# Patient Record
Sex: FEMALE | Race: BLACK OR AFRICAN AMERICAN | NOT HISPANIC OR LATINO
[De-identification: names, ages, dates, MRNs, and addresses within clinical notes are randomized per-mention and may not be internally consistent; named-entity substitution may affect disease eponyms.]

---

## 2018-05-08 ENCOUNTER — APPOINTMENT (OUTPATIENT)
Dept: NEUROLOGY | Facility: CLINIC | Age: 33
End: 2018-05-08
Payer: COMMERCIAL

## 2018-05-08 DIAGNOSIS — Z82.49 FAMILY HISTORY OF ISCHEMIC HEART DISEASE AND OTHER DISEASES OF THE CIRCULATORY SYSTEM: ICD-10-CM

## 2018-05-08 DIAGNOSIS — Z78.9 OTHER SPECIFIED HEALTH STATUS: ICD-10-CM

## 2018-05-08 DIAGNOSIS — Z80.9 FAMILY HISTORY OF MALIGNANT NEOPLASM, UNSPECIFIED: ICD-10-CM

## 2018-05-08 DIAGNOSIS — G43.709 CHRONIC MIGRAINE W/OUT AURA, NOT INTRACTABLE, W/OUT STATUS MIGRAINOSUS: ICD-10-CM

## 2018-05-08 PROCEDURE — 99204 OFFICE O/P NEW MOD 45 MIN: CPT

## 2018-08-01 ENCOUNTER — APPOINTMENT (OUTPATIENT)
Dept: NEUROLOGY | Facility: CLINIC | Age: 33
End: 2018-08-01

## 2018-11-20 ENCOUNTER — APPOINTMENT (OUTPATIENT)
Dept: NEUROLOGY | Facility: CLINIC | Age: 33
End: 2018-11-20
Payer: COMMERCIAL

## 2018-11-20 VITALS
DIASTOLIC BLOOD PRESSURE: 60 MMHG | HEIGHT: 63 IN | BODY MASS INDEX: 32.6 KG/M2 | HEART RATE: 88 BPM | WEIGHT: 184 LBS | SYSTOLIC BLOOD PRESSURE: 110 MMHG

## 2018-11-20 PROCEDURE — 99214 OFFICE O/P EST MOD 30 MIN: CPT

## 2019-05-07 ENCOUNTER — APPOINTMENT (OUTPATIENT)
Dept: NEUROLOGY | Facility: CLINIC | Age: 34
End: 2019-05-07

## 2019-09-09 ENCOUNTER — APPOINTMENT (OUTPATIENT)
Dept: OBGYN | Facility: CLINIC | Age: 34
End: 2019-09-09
Payer: COMMERCIAL

## 2019-09-09 VITALS
SYSTOLIC BLOOD PRESSURE: 130 MMHG | HEIGHT: 63 IN | BODY MASS INDEX: 33.31 KG/M2 | DIASTOLIC BLOOD PRESSURE: 84 MMHG | WEIGHT: 188 LBS | HEART RATE: 100 BPM

## 2019-09-09 DIAGNOSIS — Z01.419 ENCOUNTER FOR GYNECOLOGICAL EXAMINATION (GENERAL) (ROUTINE) W/OUT ABNORMAL FINDINGS: ICD-10-CM

## 2019-09-09 PROCEDURE — 99385 PREV VISIT NEW AGE 18-39: CPT

## 2019-09-09 RX ORDER — TRETINOIN 0.5 MG/G
0.05 CREAM TOPICAL
Refills: 0 | Status: DISCONTINUED | COMMUNITY
End: 2019-09-09

## 2019-09-09 NOTE — CHIEF COMPLAINT
[Initial Visit] : initial GYN visit [FreeTextEntry1] : 34 y.o. P 0110  presents for follow up to recent 5 mos . old demise. male child was born at 26 weeks, 2019, , at [facility], and remained in the NICU there for 2 mos . and then was transferrred to [facility] , where the  child ultimately  on 2019. secondary to chronic lung disease. . PMH- negative, PSHx- negative, FH- HTN- father, Diabetes - MGM, mat. aunt. leukemia- MGM, ( ). Breast ca - pat. aunts x 2 (  . ). SH- social ETOH, GYN hx - ETOP x 1. OB Hx - primary C/S at 26 weeks for cat 2 tracing . ROS- clinical researcher- Cora LMP 19,

## 2019-09-10 ENCOUNTER — TRANSCRIPTION ENCOUNTER (OUTPATIENT)
Age: 34
End: 2019-09-10

## 2019-09-10 LAB
ALBUMIN SERPL ELPH-MCNC: 4.5 G/DL
ALP BLD-CCNC: 86 U/L
ALT SERPL-CCNC: 8 U/L
ANION GAP SERPL CALC-SCNC: 12 MMOL/L
APTT BLD: 32.2 SEC
AST SERPL-CCNC: 15 U/L
AT III PPP CHRO-ACNC: 96 %
BASOPHILS # BLD AUTO: 0.02 K/UL
BASOPHILS NFR BLD AUTO: 0.4 %
BILIRUB SERPL-MCNC: 0.2 MG/DL
BUN SERPL-MCNC: 16 MG/DL
C TRACH RRNA SPEC QL NAA+PROBE: NOT DETECTED
CALCIUM SERPL-MCNC: 9.3 MG/DL
CHLORIDE SERPL-SCNC: 105 MMOL/L
CMV IGG SERPL QL: 0.53 U/ML
CMV IGG SERPL-IMP: NEGATIVE
CMV IGM SERPL QL: <8 AU/ML
CMV IGM SERPL QL: NEGATIVE
CO2 SERPL-SCNC: 23 MMOL/L
CONFIRM: 26.1 SEC
CREAT SERPL-MCNC: 0.7 MG/DL
DRVVT IMM 1:2 NP PPP: NORMAL
DRVVT SCREEN TO CONFIRM RATIO: 0.85 RATIO
EOSINOPHIL # BLD AUTO: 0.06 K/UL
EOSINOPHIL NFR BLD AUTO: 1.1 %
ESTIMATED AVERAGE GLUCOSE: 91 MG/DL
GLUCOSE SERPL-MCNC: 85 MG/DL
HBA1C MFR BLD HPLC: 4.8 %
HCT VFR BLD CALC: 35.1 %
HCYS SERPL-MCNC: 6.1 UMOL/L
HGB BLD-MCNC: 11 G/DL
HSV 1+2 IGG SER IA-IMP: NEGATIVE
HSV 1+2 IGG SER IA-IMP: NEGATIVE
HSV1 IGG SER QL: 0.34 INDEX
HSV2 IGG SER QL: 0.16 INDEX
IMM GRANULOCYTES NFR BLD AUTO: 0.2 %
LYMPHOCYTES # BLD AUTO: 2.2 K/UL
LYMPHOCYTES NFR BLD AUTO: 40.4 %
MAN DIFF?: NORMAL
MCHC RBC-ENTMCNC: 30.6 PG
MCHC RBC-ENTMCNC: 31.3 GM/DL
MCV RBC AUTO: 97.5 FL
MEV IGG FLD QL IA: 29.2 AU/ML
MEV IGG+IGM SER-IMP: POSITIVE
MONOCYTES # BLD AUTO: 0.25 K/UL
MONOCYTES NFR BLD AUTO: 4.6 %
N GONORRHOEA RRNA SPEC QL NAA+PROBE: NOT DETECTED
NEUTROPHILS # BLD AUTO: 2.91 K/UL
NEUTROPHILS NFR BLD AUTO: 53.3 %
PLATELET # BLD AUTO: 293 K/UL
POTASSIUM SERPL-SCNC: 4.5 MMOL/L
PROT S AG ACT/NOR PPP IA: 92 %
PROT SERPL-MCNC: 6.9 G/DL
RBC # BLD: 3.6 M/UL
RBC # FLD: 13.5 %
RUBV IGG FLD-ACNC: 3.7 INDEX
RUBV IGG SER-IMP: POSITIVE
SCREEN DRVVT: 27.7 SEC
SILICA CLOTTING TIME INTERPRETATION: NORMAL
SILICA CLOTTING TIME S/C: 0.9 RATIO
SODIUM SERPL-SCNC: 140 MMOL/L
SOURCE AMPLIFICATION: NORMAL
T GONDII AB SER-IMP: NEGATIVE
T GONDII AB SER-IMP: NEGATIVE
T GONDII IGG SER QL: <3 IU/ML
T GONDII IGM SER QL: <3 AU/ML
WBC # FLD AUTO: 5.45 K/UL

## 2019-09-11 ENCOUNTER — FORM ENCOUNTER (OUTPATIENT)
Age: 34
End: 2019-09-11

## 2019-09-11 LAB
HPV HIGH+LOW RISK DNA PNL CVX: NOT DETECTED
HSV1 IGM SER QL: NORMAL TITER
HSV2 AB FLD-ACNC: NORMAL TITER
PTR INTERP: NORMAL
RUBV IGM FLD-ACNC: <20 AU/ML

## 2019-09-12 ENCOUNTER — APPOINTMENT (OUTPATIENT)
Dept: ULTRASOUND IMAGING | Facility: CLINIC | Age: 34
End: 2019-09-12
Payer: COMMERCIAL

## 2019-09-12 ENCOUNTER — OUTPATIENT (OUTPATIENT)
Dept: OUTPATIENT SERVICES | Facility: HOSPITAL | Age: 34
LOS: 1 days | End: 2019-09-12
Payer: COMMERCIAL

## 2019-09-12 DIAGNOSIS — Z01.419 ENCOUNTER FOR GYNECOLOGICAL EXAMINATION (GENERAL) (ROUTINE) WITHOUT ABNORMAL FINDINGS: ICD-10-CM

## 2019-09-12 LAB
B2 GLYCOPROT1 AB SER QL: NEGATIVE
CARDIOLIPIN AB SER IA-ACNC: NEGATIVE
DNA PLOIDY SPEC FC-IMP: NORMAL
MEV IGM SER QL: NEGATIVE

## 2019-09-12 PROCEDURE — 76830 TRANSVAGINAL US NON-OB: CPT | Mod: 26

## 2019-09-12 PROCEDURE — 76830 TRANSVAGINAL US NON-OB: CPT

## 2019-09-12 PROCEDURE — 76856 US EXAM PELVIC COMPLETE: CPT | Mod: 26

## 2019-09-12 PROCEDURE — 76856 US EXAM PELVIC COMPLETE: CPT

## 2019-09-13 LAB — CYTOLOGY CVX/VAG DOC THIN PREP: NORMAL

## 2019-09-23 ENCOUNTER — CHART COPY (OUTPATIENT)
Age: 34
End: 2019-09-23

## 2019-09-24 ENCOUNTER — OTHER (OUTPATIENT)
Age: 34
End: 2019-09-24

## 2019-12-30 ENCOUNTER — TRANSCRIPTION ENCOUNTER (OUTPATIENT)
Age: 34
End: 2019-12-30

## 2020-01-14 ENCOUNTER — APPOINTMENT (OUTPATIENT)
Dept: OBGYN | Facility: CLINIC | Age: 35
End: 2020-01-14
Payer: COMMERCIAL

## 2020-01-14 ENCOUNTER — ASOB RESULT (OUTPATIENT)
Age: 35
End: 2020-01-14

## 2020-01-14 VITALS
SYSTOLIC BLOOD PRESSURE: 116 MMHG | HEART RATE: 77 BPM | HEIGHT: 63 IN | BODY MASS INDEX: 32.99 KG/M2 | DIASTOLIC BLOOD PRESSURE: 77 MMHG | WEIGHT: 186.19 LBS

## 2020-01-14 PROCEDURE — 76817 TRANSVAGINAL US OBSTETRIC: CPT

## 2020-01-14 PROCEDURE — 99213 OFFICE O/P EST LOW 20 MIN: CPT

## 2020-01-14 RX ORDER — MELOXICAM 15 MG/1
15 TABLET ORAL
Qty: 30 | Refills: 0 | Status: COMPLETED | COMMUNITY
Start: 2019-08-30 | End: 2020-01-14

## 2020-01-14 NOTE — CHIEF COMPLAINT
[Follow Up] : follow up GYN visit [FreeTextEntry1] : 34 y.o. P 0110  LMP 11/22/19, presents for confirmation of pregnancy. pt feels nauseous, but well, pt has hx of 26 delivery, via C/S secondary to a category 2 tracing at [facility]. history surrounding the delivery is not completely clear, but pt returned from trip to [place] and [place], and was evaluated  at routine visit post trip where fetus was found to be growth restricted. baby succumbed at 5 mos of life secondary to chronic lung disease. pt was seen by me Sept. 2019, ( see notes) and w/u was essentially non-revealing, pt has known uterine myomata pt has hx of prolactinoma ( not documented in Sept history). followed by neuroopththalmologist  and Endocrinologist at [place]  . EGA by LMP is 7 weeks 4 days.

## 2020-01-15 LAB
ABO + RH PNL BLD: NORMAL
BASOPHILS # BLD AUTO: 0.02 K/UL
BASOPHILS NFR BLD AUTO: 0.3 %
BLD GP AB SCN SERPL QL: NORMAL
C TRACH RRNA SPEC QL NAA+PROBE: NOT DETECTED
EOSINOPHIL # BLD AUTO: 0.04 K/UL
EOSINOPHIL NFR BLD AUTO: 0.6 %
HBV SURFACE AG SER QL: NONREACTIVE
HCT VFR BLD CALC: 35.8 %
HCV AB SER QL: NONREACTIVE
HCV S/CO RATIO: 0.1 S/CO
HGB BLD-MCNC: 11.7 G/DL
HIV1+2 AB SPEC QL IA.RAPID: NONREACTIVE
IMM GRANULOCYTES NFR BLD AUTO: 0.2 %
LYMPHOCYTES # BLD AUTO: 1.98 K/UL
LYMPHOCYTES NFR BLD AUTO: 31.8 %
MAN DIFF?: NORMAL
MCHC RBC-ENTMCNC: 32.5 PG
MCHC RBC-ENTMCNC: 32.7 GM/DL
MCV RBC AUTO: 99.4 FL
MEV IGG FLD QL IA: 31.6 AU/ML
MEV IGG+IGM SER-IMP: POSITIVE
MONOCYTES # BLD AUTO: 0.45 K/UL
MONOCYTES NFR BLD AUTO: 7.2 %
N GONORRHOEA RRNA SPEC QL NAA+PROBE: NOT DETECTED
NEUTROPHILS # BLD AUTO: 3.73 K/UL
NEUTROPHILS NFR BLD AUTO: 59.9 %
PLATELET # BLD AUTO: 292 K/UL
PROLACTIN SERPL-MCNC: 70.9 NG/ML
RBC # BLD: 3.6 M/UL
RBC # FLD: 12.7 %
RUBV IGG FLD-ACNC: 3.4 INDEX
RUBV IGG SER-IMP: POSITIVE
SOURCE AMPLIFICATION: NORMAL
T PALLIDUM AB SER QL IA: NEGATIVE
TSH SERPL-ACNC: 0.68 UIU/ML
VZV AB TITR SER: POSITIVE
VZV IGG SER IF-ACNC: 511.8 INDEX
WBC # FLD AUTO: 6.23 K/UL

## 2020-01-16 LAB
BACTERIA UR CULT: NORMAL
ENA SS-A AB SER IA-ACNC: <0.2 AL
ENA SS-B AB SER IA-ACNC: <0.2 AL
HGB A MFR BLD: 97.2 %
HGB A2 MFR BLD: 2.8 %
HGB FRACT BLD-IMP: NORMAL
MEV IGM SER QL: NEGATIVE

## 2020-01-21 ENCOUNTER — OTHER (OUTPATIENT)
Age: 35
End: 2020-01-21

## 2020-01-21 LAB
AR GENE MUT ANL BLD/T: NEGATIVE
B19V IGG SER QL IA: 6.4 INDEX
B19V IGG+IGM SER-IMP: NORMAL
B19V IGG+IGM SER-IMP: POSITIVE
B19V IGM FLD-ACNC: 0.3 INDEX
B19V IGM SER-ACNC: NEGATIVE
CFTR MUT TESTED BLD/T: NEGATIVE

## 2020-01-22 LAB — FMR1 GENE MUT ANL BLD/T: NORMAL

## 2020-02-19 ENCOUNTER — APPOINTMENT (OUTPATIENT)
Dept: ANTEPARTUM | Facility: CLINIC | Age: 35
End: 2020-02-19
Payer: COMMERCIAL

## 2020-02-19 ENCOUNTER — LABORATORY RESULT (OUTPATIENT)
Age: 35
End: 2020-02-19

## 2020-02-19 ENCOUNTER — ASOB RESULT (OUTPATIENT)
Age: 35
End: 2020-02-19

## 2020-02-19 PROCEDURE — 36416 COLLJ CAPILLARY BLOOD SPEC: CPT

## 2020-02-19 PROCEDURE — 76813 OB US NUCHAL MEAS 1 GEST: CPT

## 2020-02-19 PROCEDURE — 76801 OB US < 14 WKS SINGLE FETUS: CPT

## 2020-02-21 ENCOUNTER — APPOINTMENT (OUTPATIENT)
Dept: OBGYN | Facility: CLINIC | Age: 35
End: 2020-02-21
Payer: COMMERCIAL

## 2020-02-21 VITALS
WEIGHT: 189 LBS | SYSTOLIC BLOOD PRESSURE: 112 MMHG | HEIGHT: 63 IN | BODY MASS INDEX: 33.49 KG/M2 | DIASTOLIC BLOOD PRESSURE: 77 MMHG

## 2020-02-21 DIAGNOSIS — O09.219 SUPERVISION OF PREGNANCY WITH HISTORY OF PRE-TERM LABOR, UNSPECIFIED TRIMESTER: ICD-10-CM

## 2020-02-21 PROCEDURE — 0501F PRENATAL FLOW SHEET: CPT

## 2020-03-27 ENCOUNTER — APPOINTMENT (OUTPATIENT)
Dept: OBGYN | Facility: CLINIC | Age: 35
End: 2020-03-27
Payer: COMMERCIAL

## 2020-03-27 ENCOUNTER — LABORATORY RESULT (OUTPATIENT)
Age: 35
End: 2020-03-27

## 2020-03-27 VITALS
BODY MASS INDEX: 33.31 KG/M2 | WEIGHT: 188 LBS | SYSTOLIC BLOOD PRESSURE: 112 MMHG | HEIGHT: 63 IN | HEIGHT: 63 IN | DIASTOLIC BLOOD PRESSURE: 75 MMHG

## 2020-03-27 PROCEDURE — 0502F SUBSEQUENT PRENATAL CARE: CPT

## 2020-04-23 ENCOUNTER — ASOB RESULT (OUTPATIENT)
Age: 35
End: 2020-04-23

## 2020-04-23 ENCOUNTER — APPOINTMENT (OUTPATIENT)
Dept: ANTEPARTUM | Facility: CLINIC | Age: 35
End: 2020-04-23
Payer: COMMERCIAL

## 2020-04-23 PROCEDURE — 76811 OB US DETAILED SNGL FETUS: CPT

## 2020-04-24 ENCOUNTER — APPOINTMENT (OUTPATIENT)
Dept: OBGYN | Facility: CLINIC | Age: 35
End: 2020-04-24
Payer: COMMERCIAL

## 2020-04-24 VITALS
BODY MASS INDEX: 34.2 KG/M2 | HEIGHT: 63 IN | SYSTOLIC BLOOD PRESSURE: 116 MMHG | WEIGHT: 193 LBS | DIASTOLIC BLOOD PRESSURE: 74 MMHG

## 2020-04-24 PROCEDURE — 0502F SUBSEQUENT PRENATAL CARE: CPT

## 2020-04-28 ENCOUNTER — TRANSCRIPTION ENCOUNTER (OUTPATIENT)
Age: 35
End: 2020-04-28

## 2020-05-22 ENCOUNTER — APPOINTMENT (OUTPATIENT)
Dept: ANTEPARTUM | Facility: CLINIC | Age: 35
End: 2020-05-22
Payer: COMMERCIAL

## 2020-05-22 ENCOUNTER — ASOB RESULT (OUTPATIENT)
Age: 35
End: 2020-05-22

## 2020-05-22 PROCEDURE — 76816 OB US FOLLOW-UP PER FETUS: CPT

## 2020-05-26 ENCOUNTER — APPOINTMENT (OUTPATIENT)
Dept: OBGYN | Facility: CLINIC | Age: 35
End: 2020-05-26
Payer: COMMERCIAL

## 2020-05-26 VITALS
HEIGHT: 63 IN | SYSTOLIC BLOOD PRESSURE: 101 MMHG | DIASTOLIC BLOOD PRESSURE: 69 MMHG | WEIGHT: 193 LBS | BODY MASS INDEX: 34.2 KG/M2

## 2020-05-26 PROCEDURE — 0502F SUBSEQUENT PRENATAL CARE: CPT

## 2020-05-27 LAB
BASOPHILS # BLD AUTO: 0.02 K/UL
BASOPHILS NFR BLD AUTO: 0.3 %
EOSINOPHIL # BLD AUTO: 0.04 K/UL
EOSINOPHIL NFR BLD AUTO: 0.5 %
GLUCOSE 1H P 50 G GLC PO SERPL-MCNC: 61 MG/DL
HCT VFR BLD CALC: 33.3 %
HGB BLD-MCNC: 10.9 G/DL
IMM GRANULOCYTES NFR BLD AUTO: 0.4 %
LYMPHOCYTES # BLD AUTO: 2.02 K/UL
LYMPHOCYTES NFR BLD AUTO: 25.5 %
MAN DIFF?: NORMAL
MCHC RBC-ENTMCNC: 32.7 GM/DL
MCHC RBC-ENTMCNC: 33.7 PG
MCV RBC AUTO: 103.1 FL
MONOCYTES # BLD AUTO: 0.58 K/UL
MONOCYTES NFR BLD AUTO: 7.3 %
NEUTROPHILS # BLD AUTO: 5.22 K/UL
NEUTROPHILS NFR BLD AUTO: 66 %
PLATELET # BLD AUTO: 244 K/UL
RBC # BLD: 3.23 M/UL
RBC # FLD: 13 %
WBC # FLD AUTO: 7.91 K/UL

## 2020-06-09 ENCOUNTER — APPOINTMENT (OUTPATIENT)
Dept: OBGYN | Facility: CLINIC | Age: 35
End: 2020-06-09
Payer: COMMERCIAL

## 2020-06-09 VITALS
DIASTOLIC BLOOD PRESSURE: 74 MMHG | WEIGHT: 195 LBS | SYSTOLIC BLOOD PRESSURE: 115 MMHG | BODY MASS INDEX: 34.55 KG/M2 | HEIGHT: 63 IN

## 2020-06-09 PROCEDURE — 0502F SUBSEQUENT PRENATAL CARE: CPT

## 2020-06-15 ENCOUNTER — RX RENEWAL (OUTPATIENT)
Age: 35
End: 2020-06-15

## 2020-06-18 ENCOUNTER — ASOB RESULT (OUTPATIENT)
Age: 35
End: 2020-06-18

## 2020-06-18 ENCOUNTER — APPOINTMENT (OUTPATIENT)
Dept: ANTEPARTUM | Facility: CLINIC | Age: 35
End: 2020-06-18
Payer: COMMERCIAL

## 2020-06-18 PROCEDURE — 76816 OB US FOLLOW-UP PER FETUS: CPT

## 2020-06-18 PROCEDURE — 76819 FETAL BIOPHYS PROFIL W/O NST: CPT

## 2020-06-23 ENCOUNTER — APPOINTMENT (OUTPATIENT)
Dept: OBGYN | Facility: CLINIC | Age: 35
End: 2020-06-23
Payer: COMMERCIAL

## 2020-06-23 VITALS
SYSTOLIC BLOOD PRESSURE: 104 MMHG | BODY MASS INDEX: 34.73 KG/M2 | WEIGHT: 196 LBS | DIASTOLIC BLOOD PRESSURE: 71 MMHG | HEIGHT: 63 IN

## 2020-06-23 PROCEDURE — 0502F SUBSEQUENT PRENATAL CARE: CPT

## 2020-07-07 ENCOUNTER — APPOINTMENT (OUTPATIENT)
Dept: OBGYN | Facility: CLINIC | Age: 35
End: 2020-07-07
Payer: COMMERCIAL

## 2020-07-07 VITALS
HEIGHT: 63 IN | BODY MASS INDEX: 34.91 KG/M2 | SYSTOLIC BLOOD PRESSURE: 113 MMHG | DIASTOLIC BLOOD PRESSURE: 70 MMHG | WEIGHT: 197 LBS

## 2020-07-07 PROCEDURE — 0502F SUBSEQUENT PRENATAL CARE: CPT

## 2020-07-21 ENCOUNTER — APPOINTMENT (OUTPATIENT)
Dept: OBGYN | Facility: CLINIC | Age: 35
End: 2020-07-21
Payer: COMMERCIAL

## 2020-07-21 VITALS
HEIGHT: 63 IN | DIASTOLIC BLOOD PRESSURE: 65 MMHG | BODY MASS INDEX: 35.26 KG/M2 | WEIGHT: 199 LBS | SYSTOLIC BLOOD PRESSURE: 109 MMHG

## 2020-07-21 PROCEDURE — 0502F SUBSEQUENT PRENATAL CARE: CPT

## 2020-07-22 ENCOUNTER — ASOB RESULT (OUTPATIENT)
Age: 35
End: 2020-07-22

## 2020-07-22 ENCOUNTER — APPOINTMENT (OUTPATIENT)
Dept: ANTEPARTUM | Facility: CLINIC | Age: 35
End: 2020-07-22
Payer: COMMERCIAL

## 2020-07-22 PROCEDURE — 76816 OB US FOLLOW-UP PER FETUS: CPT

## 2020-07-22 PROCEDURE — 76819 FETAL BIOPHYS PROFIL W/O NST: CPT

## 2020-07-29 ENCOUNTER — OUTPATIENT (OUTPATIENT)
Dept: OUTPATIENT SERVICES | Facility: HOSPITAL | Age: 35
LOS: 1 days | End: 2020-07-29

## 2020-07-29 VITALS
RESPIRATION RATE: 16 BRPM | HEART RATE: 89 BPM | TEMPERATURE: 98 F | OXYGEN SATURATION: 99 % | WEIGHT: 192.02 LBS | DIASTOLIC BLOOD PRESSURE: 78 MMHG | SYSTOLIC BLOOD PRESSURE: 110 MMHG | HEIGHT: 63 IN

## 2020-07-29 DIAGNOSIS — Z98.890 OTHER SPECIFIED POSTPROCEDURAL STATES: ICD-10-CM

## 2020-07-29 DIAGNOSIS — Z98.891 HISTORY OF UTERINE SCAR FROM PREVIOUS SURGERY: ICD-10-CM

## 2020-07-29 DIAGNOSIS — D35.2 BENIGN NEOPLASM OF PITUITARY GLAND: ICD-10-CM

## 2020-07-29 DIAGNOSIS — Z98.891 HISTORY OF UTERINE SCAR FROM PREVIOUS SURGERY: Chronic | ICD-10-CM

## 2020-07-29 LAB
ANION GAP SERPL CALC-SCNC: 14 MMO/L — SIGNIFICANT CHANGE UP (ref 7–14)
APPEARANCE UR: SIGNIFICANT CHANGE UP
BACTERIA # UR AUTO: HIGH
BILIRUB UR-MCNC: NEGATIVE — SIGNIFICANT CHANGE UP
BLD GP AB SCN SERPL QL: NEGATIVE — SIGNIFICANT CHANGE UP
BLOOD UR QL VISUAL: NEGATIVE — SIGNIFICANT CHANGE UP
BUN SERPL-MCNC: 9 MG/DL — SIGNIFICANT CHANGE UP (ref 7–23)
CALCIUM SERPL-MCNC: 9 MG/DL — SIGNIFICANT CHANGE UP (ref 8.4–10.5)
CHLORIDE SERPL-SCNC: 101 MMOL/L — SIGNIFICANT CHANGE UP (ref 98–107)
CO2 SERPL-SCNC: 21 MMOL/L — LOW (ref 22–31)
COLOR SPEC: YELLOW — SIGNIFICANT CHANGE UP
CREAT SERPL-MCNC: 0.51 MG/DL — SIGNIFICANT CHANGE UP (ref 0.5–1.3)
GLUCOSE SERPL-MCNC: 68 MG/DL — LOW (ref 70–99)
GLUCOSE UR-MCNC: NEGATIVE — SIGNIFICANT CHANGE UP
HCT VFR BLD CALC: 31.8 % — LOW (ref 34.5–45)
HGB BLD-MCNC: 10.9 G/DL — LOW (ref 11.5–15.5)
HYALINE CASTS # UR AUTO: SIGNIFICANT CHANGE UP
KETONES UR-MCNC: NEGATIVE — SIGNIFICANT CHANGE UP
LEUKOCYTE ESTERASE UR-ACNC: SIGNIFICANT CHANGE UP
MCHC RBC-ENTMCNC: 34.3 % — SIGNIFICANT CHANGE UP (ref 32–36)
MCHC RBC-ENTMCNC: 34.3 PG — HIGH (ref 27–34)
MCV RBC AUTO: 100 FL — SIGNIFICANT CHANGE UP (ref 80–100)
MUCOUS THREADS # UR AUTO: SIGNIFICANT CHANGE UP
NITRITE UR-MCNC: NEGATIVE — SIGNIFICANT CHANGE UP
NRBC # FLD: 0 K/UL — SIGNIFICANT CHANGE UP (ref 0–0)
PH UR: 7 — SIGNIFICANT CHANGE UP (ref 5–8)
PLATELET # BLD AUTO: 249 K/UL — SIGNIFICANT CHANGE UP (ref 150–400)
PMV BLD: 10.6 FL — SIGNIFICANT CHANGE UP (ref 7–13)
POTASSIUM SERPL-MCNC: 4 MMOL/L — SIGNIFICANT CHANGE UP (ref 3.5–5.3)
POTASSIUM SERPL-SCNC: 4 MMOL/L — SIGNIFICANT CHANGE UP (ref 3.5–5.3)
PROT UR-MCNC: 30 — SIGNIFICANT CHANGE UP
RBC # BLD: 3.18 M/UL — LOW (ref 3.8–5.2)
RBC # FLD: 13 % — SIGNIFICANT CHANGE UP (ref 10.3–14.5)
RBC CASTS # UR COMP ASSIST: SIGNIFICANT CHANGE UP (ref 0–?)
RH IG SCN BLD-IMP: POSITIVE — SIGNIFICANT CHANGE UP
SODIUM SERPL-SCNC: 136 MMOL/L — SIGNIFICANT CHANGE UP (ref 135–145)
SP GR SPEC: 1.02 — SIGNIFICANT CHANGE UP (ref 1–1.04)
SQUAMOUS # UR AUTO: SIGNIFICANT CHANGE UP
UROBILINOGEN FLD QL: SIGNIFICANT CHANGE UP
WBC # BLD: 6.29 K/UL — SIGNIFICANT CHANGE UP (ref 3.8–10.5)
WBC # FLD AUTO: 6.29 K/UL — SIGNIFICANT CHANGE UP (ref 3.8–10.5)
WBC UR QL: HIGH (ref 0–?)

## 2020-07-29 NOTE — OB PST NOTE - ANESTHESIA, PREVIOUS REACTION, PROFILE
pt denies h/o general anesthesia , denies fh anesthesia complications ; s/p spinal anesthesia  , tolerated well per pt

## 2020-07-29 NOTE — OB PST NOTE - HISTORY OF PRESENT ILLNESS
Pt is a 35 y.o. female ; information obtained from pt and  worksheet provided by surgeons office ;. Pt states LMP 2019; pt reports 35 week 2 day pregnancy EDC 2020 . Pt now presents for Repeat Carlos Alberto   Pt s/p Emergency C Section 2019 for 26 week 3 day gestation ; per mother baby lived x 5.5.months @ [facility] with severe lung disease     Pt reports 25 lb weight gain with pregnancy  Pt reports positive fetal mobility Pt is a 35 y.o. female ; information obtained from pt and  worksheet provided by surgeons office ;.Pt with h/o pituitary tumor ; per pt to be tx surgically at a later date .  Pt states LMP 2019; pt reports 35 week 2 day pregnancy EDC 2020 . Pt now presents for Repeat  Section      Pt s/p Emergency C Section 2019 baby boy ;  26 week 3 day gestation  ; per mother " severe lung disease " baby  @  5.5 months @ [facility]     Pt reports 25 lb weight gain with pregnancy  Pt reports positive fetal mobility

## 2020-07-29 NOTE — OB PST NOTE - PROBLEM SELECTOR PLAN 1
Repeat  Section    Pre op instructions reviewed with pt including Hibiclens with teach back ; pt verbalized good understanding of pre op instructions

## 2020-07-29 NOTE — OB PST NOTE - NS_OBGYNHISTORY_OBGYN_ALL_OB_FT
Pt is a 35 y.o. female ; information obtained from pt and  worksheet provided by surgeons office ;. Pt states LMP 2019; pt reports 35 week 2 day pregnancy EDC 2020 . Pt now presents for Repeat Carlos Alberto   Pt s/p Emergency C Section 2019 for 26 week 3 day gestation ; per mother baby lived x 5.5.months @ [facility] with severe lung disease     Pt reports 25 lb weight gain with pregnancy  Pt reports positive fetal mobility Pt is a 35 y.o. female ; information obtained from pt and  worksheet provided by surgeons office ;.Pt with h/o pituitary tumor ; per pt to be tx surgically at a later date .  Pt states LMP 2019; pt reports 35 week 2 day pregnancy EDC 2020 . Pt now presents for Repeat  Section      Pt s/p Emergency C Section 2019 baby boy ;  26 week 3 day gestation  ; per mother " severe lung disease " baby  @  5.5 months @ [facility]     Pt reports 25 lb weight gain with pregnancy  Pt reports positive fetal mobility

## 2020-07-29 NOTE — OB PST NOTE - FAMILY HISTORY
Father  Still living? Yes, Estimated age: Age Unknown  Family history of hypertension in father, Age at diagnosis: Age Unknown     Grandparent  Still living? No  Family history of diabetes mellitus, Age at diagnosis: Age Unknown

## 2020-07-29 NOTE — OB PST NOTE - PMH
Pituitary adenoma  dx 11/18 ; being monitored by  ; to be addressed post partum Pituitary adenoma  Dx 11/18 ; being monitored by Dr Toro  ; to be addressed surgically at a later date Pituitary benign neoplasm  Being monitored by Dr Toro  [ last appt 6/2020]  ; surgery pending Dr Delgado

## 2020-07-29 NOTE — OB PST NOTE - NSHPPHYSICALEXAM_GEN_ALL_CORE
Constitutional: Well Developed, Well Groomed, Well Nourished, No Distress    Eyes: PERRL, EOMI, conjunctiva clear    Ears: Normal    Mouth & Gums: Normal, moist    Pharynx: No tenderness, discharge, or peritonsillar abscess    Tonsils: No Redness, discharge, tenderness, or swelling    Neck: Supple, no JVD, normal thyroid glands, no carotid bruits, no cervical vertebral or paraspinal tenderness        Back: Normal shape, ROM intact, strength intact, no vertebral tenderness    Respiratory: Airway patent, breath sounds equal, good air movement, respiration non-labored, clear to auscultation bilateral, no chest wall tenderness, no intercostal retractions, no rales, no wheezes, no rhonchi, no subcutaneous emphysema    Cardiovascular:  Regular rate and rhythm, no rubs or murmur, normal PMI    Gastrointestinal: Gravid Uterus ; [t reports positive fetal mobility    Extremities: No clubbing, cyanosis, or pedal edema    Vascular:  Carotid Pulse normal , Radial Pulse normal, Femoral Pulse normal, DP pulse normal, PT pulse normal    Neurological: alert & oriented x 3, sensation intact, deep reflexes intact, cranial nerve intact, normal strength    Skin: warm and dry, normal color    Lymph Nodes: normal posterior cervical lymph node, normal anterior cervical lymph node, normal supraclavicular lymph node, normal axillary lymph node, normal inguinal lymph node, normal femoral lymph node    Musculoskeletal: ROM intact, no joint swelling, warmth, or calf tenderness. Normal strength    Psychiatric: normal affect, normal behavior Constitutional: Well Developed, Well Groomed, Well Nourished, No Distress    Eyes: PERRL, EOMI, conjunctiva clear    Ears: Normal    Mouth & Gums: Normal, moist    Pharynx: No tenderness, discharge, or peritonsillar abscess    Tonsils: No Redness, discharge, tenderness, or swelling    Neck: Supple, no JVD, normal thyroid glands, no carotid bruits, no cervical vertebral or paraspinal tenderness    Back: Normal shape, ROM intact, strength intact, no vertebral tenderness    Respiratory: Airway patent, breath sounds equal, good air movement, respiration non-labored, clear to auscultation bilateral, no chest wall tenderness, no intercostal retractions, no rales, no wheezes, no rhonchi, no subcutaneous emphysema    Cardiovascular:  Regular rate and rhythm, no rubs or murmur, normal PMI    Gastrointestinal: Gravid Uterus ; Pt reports positive fetal mobility    Extremities: No clubbing, cyanosis, or pedal edema    Vascular:  Carotid Pulse normal , Radial Pulse normal, Femoral Pulse normal, DP pulse normal, PT pulse normal    Neurological: alert & oriented x 3, sensation intact, deep reflexes intact, cranial nerve intact, normal strength    Skin: warm and dry, normal color    Lymph Nodes: normal posterior cervical lymph node, normal anterior cervical lymph node, normal supraclavicular lymph node    Musculoskeletal: ROM intact, no joint swelling, warmth, or calf tenderness. Normal strength    Psychiatric: normal affect, normal behavior

## 2020-07-29 NOTE — OB PST NOTE - NSHPREVIEWOFSYSTEMS_GEN_ALL_CORE
General: No fever, chills, sweating, anorexia, + 25 lb weight gain with pregnancy     Skin: No rashes, itching, or dryness. No change in size/color of moles. No tumors, brittle nails, pitted nails, or hair loss    Breast: No tenderness, lumps, or nipple discharge      Ophthalmologic: h/o Visual disturbances left eye secondary to Pituitary adenoma ; tx Carbergoline ; dc d as per endocrinologist 1 month sgo ; Transphenoidal Excision pending ; pt denies further c/o visual disturbances ; denies headache     ENMT Symptoms: No hearing difficulty, ear pain, tinnitus, or vertigo. No sinus symptoms, nasal congestion, nasal   discharge, or nasal obstruction    Respiratory and Thorax: No wheezing, dyspnea, cough, hemoptysis, or pleuritic chest pPain     Cardiovascular: No chest pain, palpitations, dyspnea on exertion, orthopnea, paroxysmal nocturnal dyspnea,   peripheral edema, or claudication    Gastrointestinal: + N/V first trimester and third trimester 1 x daily [ am ] No diarrhea, constipation, change in bowel habits, flatulence, abdominal pain, or melena    Genitourinary/ Pelvis: No hematuria, renal colic, or flank pain.  No urine discoloration, incontinence, dysuria, or urinary hesitancy. Normal urinary frequency. No nocturia, abnormal vaginal bleeding, vaginal discharge, spotting, pelvic pain, or vaginal leakage    Musculoskeletal: H/O Back pain 2019 ; pt denies recent studies No arthralgia, arthritis, joint swelling, muscle cramping, muscle weakness, neck pain, arm pain, or leg pain    Neurological: h/o headaches No transient paralysis, weakness, paresthesias, or seizures. No syncope, tremors, vertigo, loss of sensation, difficulty walking, loss of consciousness, hemiparesis, confusion, or facial palsy    Psychiatric: No suicidal ideation, depression, anxiety, insomnia, memory loss, paranoia, mood swings, agitation, hallucinations, or hyperactivity    Hematology: No gum bleeding, nose bleeding, or skin lumps    Lymphatic: No enlarged or tender lymph nodes. No extremity swelling    Endocrine: No heat or cold intolerance    Immunologic: No recurrent or persistent infections General: No fever, chills, sweating, anorexia, + 25 lb weight gain with pregnancy     Skin: No rashes, itching, or dryness. No change in size/color of moles. No tumors, brittle nails, pitted nails, or hair loss    Breast: No tenderness, lumps, or nipple discharge      Ophthalmologic: h/o Visual disturbances left eye secondary to" Pituitary tumor "  ; tx Carbergoline ; dc d as per endocrinologist 1 month ago ; Transphenoidal Excision pending ; pt denies further c/o visual disturbances ; denies headache     ENMT Symptoms: No hearing difficulty, ear pain, tinnitus, or vertigo. No sinus symptoms, nasal congestion, nasal   discharge, or nasal obstruction    Respiratory and Thorax: No wheezing, dyspnea, cough, hemoptysis, or pleuritic chest pain     Cardiovascular: No chest pain, palpitations, dyspnea on exertion, orthopnea, paroxysmal nocturnal dyspnea,   peripheral edema, or claudication    Gastrointestinal: + N/V first trimester and third trimester 1 x daily [ am ] No diarrhea, constipation, change in bowel habits, flatulence, abdominal pain, or melena    Genitourinary/ Pelvis: No hematuria, renal colic, or flank pain.  No urine discoloration, incontinence, dysuria, or urinary hesitancy. Normal urinary frequency. No nocturia, abnormal vaginal bleeding, vaginal discharge, spotting, pelvic pain, or vaginal leakage    Musculoskeletal: H/O Back pain 2019 ; pt denies recent studies No arthralgia, arthritis, joint swelling, muscle cramping, muscle weakness, neck pain, arm pain, or leg pain    Neurological: h/o headaches; pt denies at present  No transient paralysis, weakness, paresthesias, or seizures. No syncope, tremors, vertigo, loss of sensation, difficulty walking, loss of consciousness, hemiparesis, confusion, or facial palsy    Psychiatric: No suicidal ideation, depression, anxiety, insomnia, memory loss, paranoia, mood swings, agitation, hallucinations, or hyperactivity    Hematology: No gum bleeding, nose bleeding, or skin lumps    Lymphatic: No enlarged or tender lymph nodes. No extremity swelling    Endocrine: No heat or cold intolerance    Immunologic: No recurrent or persistent infections

## 2020-08-04 ENCOUNTER — APPOINTMENT (OUTPATIENT)
Dept: OBGYN | Facility: CLINIC | Age: 35
End: 2020-08-04
Payer: COMMERCIAL

## 2020-08-04 VITALS
HEIGHT: 63 IN | WEIGHT: 198 LBS | DIASTOLIC BLOOD PRESSURE: 72 MMHG | BODY MASS INDEX: 35.08 KG/M2 | SYSTOLIC BLOOD PRESSURE: 104 MMHG

## 2020-08-04 DIAGNOSIS — Z23 ENCOUNTER FOR IMMUNIZATION: ICD-10-CM

## 2020-08-04 DIAGNOSIS — Z01.818 ENCOUNTER FOR OTHER PREPROCEDURAL EXAMINATION: ICD-10-CM

## 2020-08-04 PROCEDURE — 90715 TDAP VACCINE 7 YRS/> IM: CPT

## 2020-08-04 PROCEDURE — 99211 OFF/OP EST MAY X REQ PHY/QHP: CPT

## 2020-08-04 PROCEDURE — 0502F SUBSEQUENT PRENATAL CARE: CPT

## 2020-08-04 PROCEDURE — 90471 IMMUNIZATION ADMIN: CPT

## 2020-08-06 ENCOUNTER — TRANSCRIPTION ENCOUNTER (OUTPATIENT)
Age: 35
End: 2020-08-06

## 2020-08-07 ENCOUNTER — TRANSCRIPTION ENCOUNTER (OUTPATIENT)
Age: 35
End: 2020-08-07

## 2020-08-07 ENCOUNTER — APPOINTMENT (OUTPATIENT)
Dept: OBGYN | Facility: HOSPITAL | Age: 35
End: 2020-08-07

## 2020-08-07 ENCOUNTER — INPATIENT (INPATIENT)
Facility: HOSPITAL | Age: 35
LOS: 3 days | Discharge: HOME | End: 2020-08-11
Attending: OBSTETRICS & GYNECOLOGY | Admitting: OBSTETRICS & GYNECOLOGY
Payer: COMMERCIAL

## 2020-08-07 VITALS
DIASTOLIC BLOOD PRESSURE: 66 MMHG | RESPIRATION RATE: 14 BRPM | SYSTOLIC BLOOD PRESSURE: 108 MMHG | HEIGHT: 63 IN | TEMPERATURE: 98 F | WEIGHT: 197.98 LBS | HEART RATE: 82 BPM

## 2020-08-07 DIAGNOSIS — Z98.890 OTHER SPECIFIED POSTPROCEDURAL STATES: ICD-10-CM

## 2020-08-07 DIAGNOSIS — Z98.891 HISTORY OF UTERINE SCAR FROM PREVIOUS SURGERY: Chronic | ICD-10-CM

## 2020-08-07 LAB
B-HEM STREP SPEC QL CULT: NORMAL
BLD GP AB SCN SERPL QL: NEGATIVE — SIGNIFICANT CHANGE UP
HCT VFR BLD CALC: 35.3 % — SIGNIFICANT CHANGE UP (ref 34.5–45)
HGB BLD-MCNC: 11.3 G/DL — LOW (ref 11.5–15.5)
MCHC RBC-ENTMCNC: 32 % — SIGNIFICANT CHANGE UP (ref 32–36)
MCHC RBC-ENTMCNC: 32.9 PG — SIGNIFICANT CHANGE UP (ref 27–34)
MCV RBC AUTO: 102.9 FL — HIGH (ref 80–100)
NRBC # FLD: 0 K/UL — SIGNIFICANT CHANGE UP (ref 0–0)
PLATELET # BLD AUTO: 242 K/UL — SIGNIFICANT CHANGE UP (ref 150–400)
PMV BLD: 10.4 FL — SIGNIFICANT CHANGE UP (ref 7–13)
RBC # BLD: 3.43 M/UL — LOW (ref 3.8–5.2)
RBC # FLD: 12.9 % — SIGNIFICANT CHANGE UP (ref 10.3–14.5)
RH IG SCN BLD-IMP: POSITIVE — SIGNIFICANT CHANGE UP
SARS-COV-2 N GENE NPH QL NAA+PROBE: NOT DETECTED
T PALLIDUM AB TITR SER: NEGATIVE — SIGNIFICANT CHANGE UP
WBC # BLD: 6.94 K/UL — SIGNIFICANT CHANGE UP (ref 3.8–10.5)
WBC # FLD AUTO: 6.94 K/UL — SIGNIFICANT CHANGE UP (ref 3.8–10.5)

## 2020-08-07 PROCEDURE — 59510 CESAREAN DELIVERY: CPT | Mod: U9,UB,GC

## 2020-08-07 RX ORDER — SODIUM CHLORIDE 9 MG/ML
1000 INJECTION, SOLUTION INTRAVENOUS
Refills: 0 | Status: DISCONTINUED | OUTPATIENT
Start: 2020-08-07 | End: 2020-08-08

## 2020-08-07 RX ORDER — OXYCODONE HYDROCHLORIDE 5 MG/1
10 TABLET ORAL
Refills: 0 | Status: DISCONTINUED | OUTPATIENT
Start: 2020-08-07 | End: 2020-08-08

## 2020-08-07 RX ORDER — CITRIC ACID/SODIUM CITRATE 300-500 MG
30 SOLUTION, ORAL ORAL ONCE
Refills: 0 | Status: COMPLETED | OUTPATIENT
Start: 2020-08-07 | End: 2020-08-07

## 2020-08-07 RX ORDER — BUTORPHANOL TARTRATE 2 MG/ML
0.12 INJECTION, SOLUTION INTRAMUSCULAR; INTRAVENOUS EVERY 6 HOURS
Refills: 0 | Status: DISCONTINUED | OUTPATIENT
Start: 2020-08-07 | End: 2020-08-08

## 2020-08-07 RX ORDER — SODIUM CHLORIDE 9 MG/ML
1000 INJECTION, SOLUTION INTRAVENOUS
Refills: 0 | Status: DISCONTINUED | OUTPATIENT
Start: 2020-08-07 | End: 2020-08-07

## 2020-08-07 RX ORDER — HEPARIN SODIUM 5000 [USP'U]/ML
5000 INJECTION INTRAVENOUS; SUBCUTANEOUS EVERY 12 HOURS
Refills: 0 | Status: DISCONTINUED | OUTPATIENT
Start: 2020-08-07 | End: 2020-08-11

## 2020-08-07 RX ORDER — SENNA PLUS 8.6 MG/1
1 TABLET ORAL
Refills: 0 | Status: DISCONTINUED | OUTPATIENT
Start: 2020-08-07 | End: 2020-08-11

## 2020-08-07 RX ORDER — ONDANSETRON 8 MG/1
4 TABLET, FILM COATED ORAL EVERY 6 HOURS
Refills: 0 | Status: DISCONTINUED | OUTPATIENT
Start: 2020-08-07 | End: 2020-08-10

## 2020-08-07 RX ORDER — IBUPROFEN 200 MG
600 TABLET ORAL EVERY 6 HOURS
Refills: 0 | Status: COMPLETED | OUTPATIENT
Start: 2020-08-07 | End: 2021-07-06

## 2020-08-07 RX ORDER — TETANUS TOXOID, REDUCED DIPHTHERIA TOXOID AND ACELLULAR PERTUSSIS VACCINE, ADSORBED 5; 2.5; 8; 8; 2.5 [IU]/.5ML; [IU]/.5ML; UG/.5ML; UG/.5ML; UG/.5ML
0.5 SUSPENSION INTRAMUSCULAR ONCE
Refills: 0 | Status: DISCONTINUED | OUTPATIENT
Start: 2020-08-07 | End: 2020-08-11

## 2020-08-07 RX ORDER — OXYTOCIN 10 UNIT/ML
333.33 VIAL (ML) INJECTION
Qty: 20 | Refills: 0 | Status: DISCONTINUED | OUTPATIENT
Start: 2020-08-07 | End: 2020-08-08

## 2020-08-07 RX ORDER — OXYCODONE HYDROCHLORIDE 5 MG/1
5 TABLET ORAL
Refills: 0 | Status: DISCONTINUED | OUTPATIENT
Start: 2020-08-07 | End: 2020-08-08

## 2020-08-07 RX ORDER — HYDROMORPHONE HYDROCHLORIDE 2 MG/ML
1 INJECTION INTRAMUSCULAR; INTRAVENOUS; SUBCUTANEOUS
Refills: 0 | Status: DISCONTINUED | OUTPATIENT
Start: 2020-08-07 | End: 2020-08-08

## 2020-08-07 RX ORDER — SIMETHICONE 80 MG/1
80 TABLET, CHEWABLE ORAL EVERY 4 HOURS
Refills: 0 | Status: DISCONTINUED | OUTPATIENT
Start: 2020-08-07 | End: 2020-08-11

## 2020-08-07 RX ORDER — FERROUS SULFATE 325(65) MG
325 TABLET ORAL DAILY
Refills: 0 | Status: DISCONTINUED | OUTPATIENT
Start: 2020-08-07 | End: 2020-08-11

## 2020-08-07 RX ORDER — ACETAMINOPHEN 500 MG
975 TABLET ORAL
Refills: 0 | Status: DISCONTINUED | OUTPATIENT
Start: 2020-08-07 | End: 2020-08-11

## 2020-08-07 RX ORDER — MORPHINE SULFATE 50 MG/1
0.1 CAPSULE, EXTENDED RELEASE ORAL ONCE
Refills: 0 | Status: DISCONTINUED | OUTPATIENT
Start: 2020-08-07 | End: 2020-08-08

## 2020-08-07 RX ORDER — OXYCODONE HYDROCHLORIDE 5 MG/1
5 TABLET ORAL ONCE
Refills: 0 | Status: DISCONTINUED | OUTPATIENT
Start: 2020-08-07 | End: 2020-08-11

## 2020-08-07 RX ORDER — LANOLIN
1 OINTMENT (GRAM) TOPICAL EVERY 6 HOURS
Refills: 0 | Status: DISCONTINUED | OUTPATIENT
Start: 2020-08-07 | End: 2020-08-11

## 2020-08-07 RX ORDER — FAMOTIDINE 10 MG/ML
20 INJECTION INTRAVENOUS ONCE
Refills: 0 | Status: COMPLETED | OUTPATIENT
Start: 2020-08-07 | End: 2020-08-07

## 2020-08-07 RX ORDER — SODIUM CHLORIDE 9 MG/ML
1000 INJECTION, SOLUTION INTRAVENOUS ONCE
Refills: 0 | Status: COMPLETED | OUTPATIENT
Start: 2020-08-07 | End: 2020-08-07

## 2020-08-07 RX ORDER — NALOXONE HYDROCHLORIDE 4 MG/.1ML
0.1 SPRAY NASAL
Refills: 0 | Status: DISCONTINUED | OUTPATIENT
Start: 2020-08-07 | End: 2020-08-08

## 2020-08-07 RX ORDER — KETOROLAC TROMETHAMINE 30 MG/ML
30 SYRINGE (ML) INJECTION EVERY 6 HOURS
Refills: 0 | Status: DISCONTINUED | OUTPATIENT
Start: 2020-08-07 | End: 2020-08-08

## 2020-08-07 RX ORDER — DIPHENHYDRAMINE HCL 50 MG
25 CAPSULE ORAL EVERY 6 HOURS
Refills: 0 | Status: DISCONTINUED | OUTPATIENT
Start: 2020-08-07 | End: 2020-08-11

## 2020-08-07 RX ORDER — OXYCODONE HYDROCHLORIDE 5 MG/1
5 TABLET ORAL
Refills: 0 | Status: COMPLETED | OUTPATIENT
Start: 2020-08-07 | End: 2020-08-14

## 2020-08-07 RX ORDER — MAGNESIUM HYDROXIDE 400 MG/1
30 TABLET, CHEWABLE ORAL
Refills: 0 | Status: DISCONTINUED | OUTPATIENT
Start: 2020-08-07 | End: 2020-08-11

## 2020-08-07 RX ORDER — METOCLOPRAMIDE HCL 10 MG
10 TABLET ORAL ONCE
Refills: 0 | Status: COMPLETED | OUTPATIENT
Start: 2020-08-07 | End: 2020-08-07

## 2020-08-07 RX ADMIN — HYDROMORPHONE HYDROCHLORIDE 1 MILLIGRAM(S): 2 INJECTION INTRAMUSCULAR; INTRAVENOUS; SUBCUTANEOUS at 12:30

## 2020-08-07 RX ADMIN — HYDROMORPHONE HYDROCHLORIDE 1 MILLIGRAM(S): 2 INJECTION INTRAMUSCULAR; INTRAVENOUS; SUBCUTANEOUS at 11:50

## 2020-08-07 RX ADMIN — HEPARIN SODIUM 5000 UNIT(S): 5000 INJECTION INTRAVENOUS; SUBCUTANEOUS at 18:00

## 2020-08-07 RX ADMIN — FAMOTIDINE 20 MILLIGRAM(S): 10 INJECTION INTRAVENOUS at 07:49

## 2020-08-07 RX ADMIN — Medication 30 MILLIGRAM(S): at 18:00

## 2020-08-07 RX ADMIN — SODIUM CHLORIDE 125 MILLILITER(S): 9 INJECTION, SOLUTION INTRAVENOUS at 07:51

## 2020-08-07 RX ADMIN — Medication 30 MILLIGRAM(S): at 18:30

## 2020-08-07 RX ADMIN — SODIUM CHLORIDE 2000 MILLILITER(S): 9 INJECTION, SOLUTION INTRAVENOUS at 07:51

## 2020-08-07 RX ADMIN — Medication 10 MILLIGRAM(S): at 07:49

## 2020-08-07 RX ADMIN — Medication 30 MILLILITER(S): at 07:49

## 2020-08-07 NOTE — OB PROVIDER H&P - HISTORY OF PRESENT ILLNESS
Patient is a 34yo  @ 36+4 presents for a scheduled  for history of classical  '. Denies ctx, lof, vb & endorses +FM. PNC c/b +COVID in March. Patient also with pituitary microadenoma tumor dx with last pregnancy. PNC otherwise uncomplicated. Patient accepts blood transfusion.     POBHx: 2019 Classical  at 26 weeks - baby with severe IUGR -  @ 5.5months at [facility]   GynHx: denies  MedHx: pituitary microadenoma   NKDA  Meds: PNV, Carbergoline - not currently taking, Clinda lotion   Social: denies    VS  T(C): --  HR: --  BP: --  RR: --  SpO2: --    comfortable  abd soft gravid    EFM/[place] pending

## 2020-08-07 NOTE — DISCHARGE NOTE OB - HOSPITAL COURSE
35 y.o. P 0110 at 37 weeks, with hx of prior classical C/S at 26 weeks. presents for repeat C/S. PNC at [facility] - Dr. Garcia co-morbid condition, pituitary macroadenoma, pt was on Cabergoline earlier in the pregnancy, but was subsequently taken  off of meds by primary physician . on 20 at 37 weeks, gestation , pt underwent a repeat LST C/S , and delivered a live female , taken to the NICU for prematurity. pt subsequently did well and was discharged home in stable condition.

## 2020-08-07 NOTE — DISCHARGE NOTE OB - PLAN OF CARE
full recovery normal activity, regular diet, follow up in office in 6 weeks. as above stability as above, and f/u with Endocrinologist.

## 2020-08-07 NOTE — OB PROVIDER H&P - ASSESSMENT
@ 36+4 presenting for a scheduled  2/ h/o prior classical  - admit to L&D  - routine labs  - IV fluids  - preop meds  - anesthesia consult    jessee bañuelos

## 2020-08-07 NOTE — DISCHARGE NOTE OB - MEDICATION SUMMARY - MEDICATIONS TO STOP TAKING
I will STOP taking the medications listed below when I get home from the hospital:    Prenatal Multivitamins oral tablet  -- with DHA ; pt alternates 1 pre piyush mvi daily    PreNata oral tablet, chewable  -- 1 tab(s) by mouth once a day

## 2020-08-07 NOTE — OB PROVIDER H&P - NS_OBGYNHISTORY_OBGYN_ALL_OB_FT
Pt is a 35 y.o. female ; information obtained from pt and  worksheet provided by surgeons office ;.Pt with h/o pituitary tumor ; per pt to be tx surgically at a later date .  Pt states LMP 2019; pt reports 35 week 2 day pregnancy EDC 2020 . Pt now presents for Repeat  Section      Pt s/p Emergency C Section 2019 baby boy ;  26 week 3 day gestation  ; per mother " severe lung disease " baby  @  5.5 months @ [facility]     Pt reports 25 lb weight gain with pregnancy  Pt reports positive fetal mobility

## 2020-08-07 NOTE — OB PROVIDER H&P - PMH
Pituitary benign neoplasm  Being monitored by Dr Toro  [ last appt 6/2020]  ; surgery pending Dr Delgado

## 2020-08-07 NOTE — DISCHARGE NOTE OB - PATIENT PORTAL LINK FT
You can access the FollowMyHealth Patient Portal offered by [facility] by registering at the following website: [email or web address]. By joining [facility]’s FollowMyHealth portal, you will also be able to view your health information using other applications (apps) compatible with our system.

## 2020-08-07 NOTE — OB RN DELIVERY SUMMARY - NS_VACUUMINDICAT_OBGYN_ALL_OB
SUBJECTIVE:  Meena Flores is a 5 year old male who presents with a chief complaint of fever and mouth pain. It started 2 day(s) ago. Symptoms are sudden onset and severe. His fever has resolved, but he still has the pain in his mouth.  Patient states that his mouth is burning.     Associated symptoms:    Fever: low grade fevers    ENT: none    Chest:none    GIdecreased appetite  Recent illnesses: none  Sick contacts: none known    No past medical history on file.  Current Outpatient Prescriptions   Medication Sig Dispense Refill     Calcium & Magnesium Carbonates (MYLANTA PO)        ibuprofen (ADVIL/MOTRIN) 100 MG/5ML suspension Take 10 mg/kg by mouth every 6 hours as needed for fever or moderate pain       Social History   Substance Use Topics     Smoking status: Never Smoker     Smokeless tobacco: Not on file     Alcohol use Not on file     ROS:  C: POSITIVE for fever  INTEGUMENTARY/SKIN: POSITIVE for rash on hands  E/M: POSITIVE for mouth pain  R: NEGATIVE for cough  GI: POSITIVE for decreased appetitie  HEME/ALLERGY/IMMUNE: NEGATIVE for swollen nodes      OBJECTIVE:  Pulse 100  Temp 97.6  F (36.4  C) (Tympanic)  Wt 27 lb 12.8 oz (12.6 kg)  SpO2 100%  GENERAL: Alert, interactive, no acute distress.  SKIN: Multiple erythematous papular lesions found on palms b/l  MOUTH: Multiple vesicles on tongue and soft palate with surrounding erythema. NO secondary sign of infection. Tonsil WNL. No drooling or trismus.   HEAD: The head is normocephalic.   EYES: conjunctivae and cornea normal.without erythema or discharge  NOSE: Clear, no discharge or congestion: THROAT: moist mucous membranes, no erythema.  NECK: The neck is supple, no masses or significant adenopathy noted  LUNGS: clear to auscultation, no rales, rhonchi, wheezing or retractions  CV: regular rate and rhythm. S1 and S2 are normal. No murmurs.    ASSESSMENT/PLAN:  1. Hand, foot and mouth disease  Went over prognosis of infection. Supportive cares  encouraged.     [facility]       Other

## 2020-08-07 NOTE — PROVIDER CONTACT NOTE (OTHER) - BACKGROUND
patient postpartum in PACU since 1040am. Patient HR 68 bpm @ 1040am upon entering PACU. In postpartum [period, HR noted to be 47-60 bpm. Shock index 0.5, WNL

## 2020-08-07 NOTE — OB NEONATOLOGY/PEDIATRICIAN DELIVERY SUMMARY - NSPEDSNEONOTESA_OBGYN_ALL_OB_FT
Called to OR for scheduled C section, due to repeat C section in 2019 for 26 weeker, baby with severe IUGR, less than 1 pound born at [facility] and transffered at [facility] but  at 5.5 months of age. Mom is 34 yo, G 2P0, now 36w 4d, PNL acceptable, A+GBS unkn, no ROM, no labor, mom with neg Ob/Gyn Hx, known with Pituitary adenoma  ( took Carbergoline but not currently taking).   Full term baby , cried immediately, good muscle tone after birth. Delayed cord clamping done for 40 sec. Baby received under the preheated warmer, head positioned, nose,  cleared, baby was dried and basal resuscitation done. HR, resp rate  wnl, good resp effort. Parents informed. APGAR 8/9. BW-2020g PMD: Dr Roslaes, mom wants breast/bottle, mom does not want hep B vaccine. Transferred to NICU for low birth weight

## 2020-08-07 NOTE — DISCHARGE NOTE OB - MEDICATION SUMMARY - MEDICATIONS TO TAKE
I will START or STAY ON the medications listed below when I get home from the hospital:    ibuprofen 600 mg oral tablet  -- 1 tab(s) by mouth every 6 hours  -- Indication: For H/O:  section I will START or STAY ON the medications listed below when I get home from the hospital:    acetaminophen 325 mg oral tablet  -- 3 tab(s) by mouth , As Needed  -- Indication: For Pain    ibuprofen 600 mg oral tablet  -- 1 tab(s) by mouth every 6 hours, As Needed  -- Indication: For Pain I will START or STAY ON the medications listed below when I get home from the hospital:    acetaminophen 325 mg oral tablet  -- 3 tab(s) by mouth , As Needed  -- Indication: For Pain    ibuprofen 600 mg oral tablet  -- 1 tab(s) by mouth every 6 hours, As Needed  -- Indication: For Pain    oxyCODONE 5 mg oral capsule  -- 1 cap(s) by mouth every 6 hours MDD:4 tabs  -- Caution federal law prohibits the transfer of this drug to any person other  than the person for whom it was prescribed.  It is very important that you take or use this exactly as directed.  Do not skip doses or discontinue unless directed by your doctor.  May cause drowsiness or dizziness.  This prescription cannot be refilled.  Using more of this medication than prescribed may cause serious breathing problems.    -- Indication: For H/O:  section

## 2020-08-07 NOTE — OB RN DELIVERY SUMMARY - NS_SEPSISRSKCALC_OBGYN_ALL_OB_FT
EOS calculated successfully. EOS Risk Factor: 0.5/1000 live births ([facility] national incidence); GA=36w4d; Temp=98.2; ROM=0.017; GBS='Unknown'; Antibiotics='No antibiotics or any antibiotics < 2 hrs prior to birth'

## 2020-08-07 NOTE — DISCHARGE NOTE OB - CARE PROVIDER_API CALL
Stevie Garcia  OBSTETRICS AND GYNECOLOGY  [address] [place], [place] [id number]  Phone: [phone]  Fax: [phone]  Follow Up Time:

## 2020-08-07 NOTE — OB RN DELIVERY SUMMARY - BABY A: APGAR 1 MIN RESP RATE, DELIVERY
Detail Level: Detailed Note Text (......Xxx Chief Complaint.): This diagnosis correlates with the Other (Free Text): Comparison photo used today. No evidence of change. Pt can not recall any trauma to this area. Will continue to follow. Other (Free Text): Pt has been using the [facility] Inspired Care kit for 3 months.  Noticing improvement but still not completely clear. Other (Free Text): Reevaluate 1 month of persist biopsy next. (2) good, crying

## 2020-08-07 NOTE — DISCHARGE NOTE OB - NURSING SECTION COMPLETE
I spoke with patient she will restart dextromethorphan and informed to try antihistamine and will be scheduled at the end of week to see Tomasz. Per Yeimy if [facility] calls to be seen we can reschedule her appointment with Tomasz. [facility] has info and they will call her to schedule a appointment at [facility]   Patient/Caregiver provided printed discharge information.

## 2020-08-07 NOTE — PROVIDER CONTACT NOTE (OTHER) - ACTION/TREATMENT ORDERED:
1. Patient cleared to go to postpartum unit now @ 1422 by MD Kidd  2. Patient cleared to go to postpartum unit now @ 1426 by MD Rapp

## 2020-08-07 NOTE — DISCHARGE NOTE OB - CARE PLAN
Principal Discharge DX:	Previous  section  Goal:	full recovery  Assessment and plan of treatment:	normal activity, regular diet, follow up in office in 6 weeks.  Secondary Diagnosis:	History of classical  section  Goal:	as above  Assessment and plan of treatment:	as above  Secondary Diagnosis:	Pituitary benign neoplasm  Goal:	stability  Assessment and plan of treatment:	as above, and f/u with Endocrinologist.

## 2020-08-08 LAB
BASOPHILS # BLD AUTO: 0.02 K/UL — SIGNIFICANT CHANGE UP (ref 0–0.2)
BASOPHILS NFR BLD AUTO: 0.2 % — SIGNIFICANT CHANGE UP (ref 0–2)
EOSINOPHIL # BLD AUTO: 0.02 K/UL — SIGNIFICANT CHANGE UP (ref 0–0.5)
EOSINOPHIL NFR BLD AUTO: 0.2 % — SIGNIFICANT CHANGE UP (ref 0–6)
HCT VFR BLD CALC: 27.4 % — LOW (ref 34.5–45)
HGB BLD-MCNC: 9.1 G/DL — LOW (ref 11.5–15.5)
IMM GRANULOCYTES NFR BLD AUTO: 0.3 % — SIGNIFICANT CHANGE UP (ref 0–1.5)
LYMPHOCYTES # BLD AUTO: 1.84 K/UL — SIGNIFICANT CHANGE UP (ref 1–3.3)
LYMPHOCYTES # BLD AUTO: 19.6 % — SIGNIFICANT CHANGE UP (ref 13–44)
MCHC RBC-ENTMCNC: 33.1 PG — SIGNIFICANT CHANGE UP (ref 27–34)
MCHC RBC-ENTMCNC: 33.2 % — SIGNIFICANT CHANGE UP (ref 32–36)
MCV RBC AUTO: 99.6 FL — SIGNIFICANT CHANGE UP (ref 80–100)
MONOCYTES # BLD AUTO: 0.77 K/UL — SIGNIFICANT CHANGE UP (ref 0–0.9)
MONOCYTES NFR BLD AUTO: 8.2 % — SIGNIFICANT CHANGE UP (ref 2–14)
NEUTROPHILS # BLD AUTO: 6.73 K/UL — SIGNIFICANT CHANGE UP (ref 1.8–7.4)
NEUTROPHILS NFR BLD AUTO: 71.5 % — SIGNIFICANT CHANGE UP (ref 43–77)
NRBC # FLD: 0 K/UL — SIGNIFICANT CHANGE UP (ref 0–0)
PLATELET # BLD AUTO: 214 K/UL — SIGNIFICANT CHANGE UP (ref 150–400)
PMV BLD: 10.6 FL — SIGNIFICANT CHANGE UP (ref 7–13)
RBC # BLD: 2.75 M/UL — LOW (ref 3.8–5.2)
RBC # FLD: 13.1 % — SIGNIFICANT CHANGE UP (ref 10.3–14.5)
WBC # BLD: 9.41 K/UL — SIGNIFICANT CHANGE UP (ref 3.8–10.5)
WBC # FLD AUTO: 9.41 K/UL — SIGNIFICANT CHANGE UP (ref 3.8–10.5)

## 2020-08-08 RX ORDER — IBUPROFEN 200 MG
600 TABLET ORAL EVERY 6 HOURS
Refills: 0 | Status: DISCONTINUED | OUTPATIENT
Start: 2020-08-08 | End: 2020-08-11

## 2020-08-08 RX ORDER — OXYCODONE HYDROCHLORIDE 5 MG/1
5 TABLET ORAL
Refills: 0 | Status: DISCONTINUED | OUTPATIENT
Start: 2020-08-08 | End: 2020-08-11

## 2020-08-08 RX ADMIN — Medication 30 MILLIGRAM(S): at 05:56

## 2020-08-08 RX ADMIN — HEPARIN SODIUM 5000 UNIT(S): 5000 INJECTION INTRAVENOUS; SUBCUTANEOUS at 18:46

## 2020-08-08 RX ADMIN — SIMETHICONE 80 MILLIGRAM(S): 80 TABLET, CHEWABLE ORAL at 05:57

## 2020-08-08 RX ADMIN — Medication 975 MILLIGRAM(S): at 18:45

## 2020-08-08 RX ADMIN — Medication 600 MILLIGRAM(S): at 13:27

## 2020-08-08 RX ADMIN — Medication 30 MILLIGRAM(S): at 06:25

## 2020-08-08 RX ADMIN — OXYCODONE HYDROCHLORIDE 5 MILLIGRAM(S): 5 TABLET ORAL at 20:45

## 2020-08-08 RX ADMIN — SIMETHICONE 80 MILLIGRAM(S): 80 TABLET, CHEWABLE ORAL at 20:15

## 2020-08-08 RX ADMIN — SENNA PLUS 1 TABLET(S): 8.6 TABLET ORAL at 05:56

## 2020-08-08 RX ADMIN — Medication 975 MILLIGRAM(S): at 00:23

## 2020-08-08 RX ADMIN — OXYCODONE HYDROCHLORIDE 5 MILLIGRAM(S): 5 TABLET ORAL at 20:15

## 2020-08-08 RX ADMIN — Medication 975 MILLIGRAM(S): at 00:50

## 2020-08-08 RX ADMIN — Medication 600 MILLIGRAM(S): at 14:00

## 2020-08-08 RX ADMIN — SENNA PLUS 1 TABLET(S): 8.6 TABLET ORAL at 20:15

## 2020-08-08 RX ADMIN — HEPARIN SODIUM 5000 UNIT(S): 5000 INJECTION INTRAVENOUS; SUBCUTANEOUS at 05:56

## 2020-08-08 RX ADMIN — Medication 975 MILLIGRAM(S): at 19:00

## 2020-08-08 RX ADMIN — Medication 1 TABLET(S): at 16:54

## 2020-08-08 NOTE — PROGRESS NOTE ADULT - SUBJECTIVE AND OBJECTIVE BOX
Postpartum Note,  Section  She is a  35y woman who is now post-operative day 1    Subjective:  The patient feels well.  She is ambulating.   She is tolerating regular diet.  She denies nausea and vomiting.  She is voiding.  Her pain is controlled.  She reports normal postpartum bleeding.  She is breastfeeding.      Physical exam:    Vital Signs Last 24 Hrs  T(C): 36.4 (08 Aug 2020 06:49), Max: 37.1 (08 Aug 2020 01:27)  T(F): 97.5 (08 Aug 2020 06:49), Max: 98.7 (08 Aug 2020 01:27)  HR: 72 (08 Aug 2020 06:49) (47 - 72)  BP: 103/64 (08 Aug 2020 06:49) (100/59 - 127/83)  BP(mean): 80 (07 Aug 2020 15:00) (80 - 92)  RR: 18 (08 Aug 2020 06:49) (0 - 21)  SpO2: 95% (08 Aug 2020 06:49) (90% - 100%)    Gen: NAD  Breast: Soft, nontender, not engorged.  Abdomen: Soft, nontender, no distension , firm uterine fundus at umbilicus.  Incision: dressing removed, incision isClean, dry, and intact with steri strips  Ext: No calf tenderness    LABS:                        9.1    9.41  )-----------( 214      ( 08 Aug 2020 06:39 )             27,4    Allergies    No Known Allergies      MEDICATIONS  (STANDING):  acetaminophen   Tablet .. 975 milliGRAM(s) Oral <User Schedule>  diphtheria/tetanus/pertussis (acellular) Vaccine (ADAcel) 0.5 milliLiter(s) IntraMuscular once  ferrous    sulfate 325 milliGRAM(s) Oral daily  heparin   Injectable 5000 Unit(s) SubCutaneous every 12 hours  ibuprofen  Tablet. 600 milliGRAM(s) Oral every 6 hours  ketorolac   Injectable 30 milliGRAM(s) IV Push every 6 hours  lactated ringers. 1000 milliLiter(s) (125 mL/Hr) IV Continuous <Continuous>  morphine PF Spinal 0.1 milliGRAM(s) IntraThecal. once  oxytocin Infusion 333.333 milliUNIT(s)/Min (1000 mL/Hr) IV Continuous <Continuous>  prenatal multivitamin 1 Tablet(s) Oral daily    MEDICATIONS  (PRN):  butorphanol Injectable 0.125 milliGRAM(s) IV Push every 6 hours PRN Pruritus  diphenhydrAMINE 25 milliGRAM(s) Oral every 6 hours PRN Itching  HYDROmorphone  Injectable 1 milliGRAM(s) IV Push every 3 hours PRN Severe Pain (7 - 10)  lanolin Ointment 1 Application(s) Topical every 6 hours PRN Sore Nipples  magnesium hydroxide Suspension 30 milliLiter(s) Oral two times a day PRN Constipation  naloxone Injectable 0.1 milliGRAM(s) IV Push every 3 minutes PRN For ANY of the following changes in patient status:  A. RR LESS THAN 10 breaths per minute, B. Oxygen saturation LESS THAN 90%, C. Sedation score of 6  ondansetron Injectable 4 milliGRAM(s) IV Push every 6 hours PRN Nausea  oxyCODONE    IR 5 milliGRAM(s) Oral every 3 hours PRN Mild Pain (1 - 3)  oxyCODONE    IR 10 milliGRAM(s) Oral every 3 hours PRN Moderate Pain (4 - 6)  oxyCODONE    IR 5 milliGRAM(s) Oral every 3 hours PRN Moderate to Severe Pain (4-10)  oxyCODONE    IR 5 milliGRAM(s) Oral once PRN Moderate to Severe Pain (4-10)  senna 1 Tablet(s) Oral two times a day PRN Constipation  simethicone 80 milliGRAM(s) Chew every 4 hours PRN Gas        Assessment and Plan  POD # 1  s/p  section  Doing well.  Encourage ambulation.  Continue routine post op care.

## 2020-08-08 NOTE — PROGRESS NOTE ADULT - SUBJECTIVE AND OBJECTIVE BOX
ANESTHESIA POSTOP CHECK    35y Female POSTOP DAY 1 S/P     [ X] NO APPARENT ANESTHESIA COMPLICATIONS      Comments:

## 2020-08-09 RX ORDER — IBUPROFEN 200 MG
1 TABLET ORAL
Qty: 0 | Refills: 0 | DISCHARGE

## 2020-08-09 RX ORDER — ACETAMINOPHEN 500 MG
3 TABLET ORAL
Qty: 0 | Refills: 0 | DISCHARGE
Start: 2020-08-09

## 2020-08-09 RX ADMIN — OXYCODONE HYDROCHLORIDE 5 MILLIGRAM(S): 5 TABLET ORAL at 14:07

## 2020-08-09 RX ADMIN — OXYCODONE HYDROCHLORIDE 5 MILLIGRAM(S): 5 TABLET ORAL at 04:15

## 2020-08-09 RX ADMIN — Medication 600 MILLIGRAM(S): at 18:53

## 2020-08-09 RX ADMIN — Medication 1 TABLET(S): at 12:17

## 2020-08-09 RX ADMIN — Medication 975 MILLIGRAM(S): at 04:15

## 2020-08-09 RX ADMIN — Medication 600 MILLIGRAM(S): at 12:17

## 2020-08-09 RX ADMIN — HEPARIN SODIUM 5000 UNIT(S): 5000 INJECTION INTRAVENOUS; SUBCUTANEOUS at 17:48

## 2020-08-09 RX ADMIN — Medication 975 MILLIGRAM(S): at 03:50

## 2020-08-09 RX ADMIN — Medication 600 MILLIGRAM(S): at 01:05

## 2020-08-09 RX ADMIN — Medication 600 MILLIGRAM(S): at 14:07

## 2020-08-09 RX ADMIN — OXYCODONE HYDROCHLORIDE 5 MILLIGRAM(S): 5 TABLET ORAL at 17:48

## 2020-08-09 RX ADMIN — Medication 975 MILLIGRAM(S): at 21:35

## 2020-08-09 RX ADMIN — Medication 600 MILLIGRAM(S): at 05:35

## 2020-08-09 RX ADMIN — Medication 600 MILLIGRAM(S): at 00:36

## 2020-08-09 RX ADMIN — Medication 600 MILLIGRAM(S): at 17:48

## 2020-08-09 RX ADMIN — OXYCODONE HYDROCHLORIDE 5 MILLIGRAM(S): 5 TABLET ORAL at 22:06

## 2020-08-09 RX ADMIN — OXYCODONE HYDROCHLORIDE 5 MILLIGRAM(S): 5 TABLET ORAL at 03:48

## 2020-08-09 RX ADMIN — Medication 975 MILLIGRAM(S): at 22:05

## 2020-08-09 RX ADMIN — OXYCODONE HYDROCHLORIDE 5 MILLIGRAM(S): 5 TABLET ORAL at 21:36

## 2020-08-09 RX ADMIN — Medication 325 MILLIGRAM(S): at 12:17

## 2020-08-09 RX ADMIN — OXYCODONE HYDROCHLORIDE 5 MILLIGRAM(S): 5 TABLET ORAL at 12:16

## 2020-08-09 RX ADMIN — HEPARIN SODIUM 5000 UNIT(S): 5000 INJECTION INTRAVENOUS; SUBCUTANEOUS at 05:35

## 2020-08-09 RX ADMIN — Medication 600 MILLIGRAM(S): at 06:05

## 2020-08-09 NOTE — PROGRESS NOTE ADULT - SUBJECTIVE AND OBJECTIVE BOX
SUBJECTIVE:    Pain: Controlled    Complaints: None    MILESTONES:    Alert and Oriented x 3  [ x ]  Out of bed/ ambulating. [ x ]  Flatus:   Positive [ x ]  Negative [  ]  Bowel movement  [  ] Positive [  ] Negative   Voiding [x  ] Due to void [  ]   Bianchi/Indwelling catheter in place [  ]  Diet: Regular [ x ]  Clears [  ]  NPO [  ]    Infant feeding:  Breast [  ]   Bottle [  ]  Both [ X ]  Feeding related issues and/or concerns:      OBJECTIVE:  T(C): 36.7 (20 @ 06:00), Max: 37 (20 @ 14:05)  HR: 64 (20 @ 06:00) (64 - 88)  BP: 105/69 (20 @ 06:00) (105/69 - 109/65)  RR: 16 (20 @ 06:00) (16 - 16)  SpO2: 99% (20 @ 06:00) (96% - 99%)  Wt(kg): --                        9.1    9.41  )-----------( 214      ( 08 Aug 2020 06:39 )             27.4           Blood Type: A Positive    RPR: Negative          MEDICATIONS  (STANDING):  acetaminophen   Tablet .. 975 milliGRAM(s) Oral <User Schedule>  diphtheria/tetanus/pertussis (acellular) Vaccine (ADAcel) 0.5 milliLiter(s) IntraMuscular once  ferrous    sulfate 325 milliGRAM(s) Oral daily  heparin   Injectable 5000 Unit(s) SubCutaneous every 12 hours  ibuprofen  Tablet. 600 milliGRAM(s) Oral every 6 hours  prenatal multivitamin 1 Tablet(s) Oral daily    MEDICATIONS  (PRN):  diphenhydrAMINE 25 milliGRAM(s) Oral every 6 hours PRN Itching  lanolin Ointment 1 Application(s) Topical every 6 hours PRN Sore Nipples  magnesium hydroxide Suspension 30 milliLiter(s) Oral two times a day PRN Constipation  ondansetron Injectable 4 milliGRAM(s) IV Push every 6 hours PRN Nausea  oxyCODONE    IR 5 milliGRAM(s) Oral once PRN Moderate to Severe Pain (4-10)  oxyCODONE    IR 5 milliGRAM(s) Oral every 3 hours PRN Moderate to Severe Pain (4-10)  senna 1 Tablet(s) Oral two times a day PRN Constipation  simethicone 80 milliGRAM(s) Chew every 4 hours PRN Gas        ASSESSMENT:    35y     G   2   P   0201      PO Day#  2        Delivery: Primary [  ]    Repeat [ X ]       QBL - 511                                  Indication of procedure: Scheduled, had a prior classical C/S at 26 wks gest, now at 36+ wks.    Condition: Stable    Past Medical History significant for: HPI:  Patient is a 36yo  @ 36+4 presents for a scheduled  for history of classical  '. Denies ctx, lof, vb & endorses +FM. PNC c/b +COVID in March. Patient also with pituitary microadenoma tumor dx with last pregnancy. PNC otherwise uncomplicated. Patient accepts blood transfusion.     POBHx: 2019 Classical  at 26 weeks - baby with severe IUGR -  @ 5.5months at [facility]   GynHx: denies  MedHx: pituitary microadenoma   NKDA  Meds: PNV, Carbergoline - not currently taking, Clinda lotion   Social: denies    VS  T(C): --  HR: --  BP: --  RR: --  SpO2: --    comfortable  abd soft gravid    EFM/[place] pending (07 Aug 2020 07:13)      Current Issues:    Breasts:  Soft [x  ]   Engorged [  ]  Nipples:  Abdomen: Soft [ x ]   Distended [  ] Nontender [  ]     Bowel sounds :  Present [  ]  Absent [  ]   Fundus:  Firm [x  ]  Boggy [  ]    Abdominal incision: Clean, Dry and Intact [x  ]  Staples [  ] Steri Strips [  ] Dermabond [  ] Sutures [  ]    Patient wearing abdominal binder for support.    Vaginal: Lochia:  Heavy [  ]  Moderate [ x ]   Scant [  ]    Extremities: Edema [  ] Negative Alicia's Sign [  ] Nontender Dandre  [ x ] Positive pedal pulses [  ]    Other relevant physical exam findings:      PLAN:    Plan: Increase ambulation, analgesia PRN and pain medication protocol standing oxycodone, ibuprofen and acetaminophen.    Diet: Regular diet    Continue routine post-operative and postpartum care.  SW Consult for a Prior  Loss at 5 1/2 months ( was a 26 wk delivery)    Discharge Planning [ x ]    For discharge Today  [  X  ]    Consults:  Social Work [ X ]  Lactation [ x ]  Other [         ] SUBJECTIVE:    Pain: Controlled    Complaints: None    MILESTONES:    Alert and Oriented x 3  [ x ]  Out of bed/ ambulating. [ x ]  Flatus:   Positive [ x ]  Negative [  ]  Bowel movement  [  ] Positive [  ] Negative   Voiding [x  ] Due to void [  ]   Bianchi/Indwelling catheter in place [  ]  Diet: Regular [ x ]  Clears [  ]  NPO [  ]    Infant feeding:  Breast [  ]   Bottle [  ]  Both [ X ]  Feeding related issues and/or concerns:      OBJECTIVE:  T(C): 36.7 (20 @ 06:00), Max: 37 (20 @ 14:05)  HR: 64 (20 @ 06:00) (64 - 88)  BP: 105/69 (20 @ 06:00) (105/69 - 109/65)  RR: 16 (20 @ 06:00) (16 - 16)  SpO2: 99% (20 @ 06:00) (96% - 99%)  Wt(kg): --                        9.1    9.41  )-----------( 214      ( 08 Aug 2020 06:39 )             27.4           Blood Type: A Positive    RPR: Negative          MEDICATIONS  (STANDING):  acetaminophen   Tablet .. 975 milliGRAM(s) Oral <User Schedule>  diphtheria/tetanus/pertussis (acellular) Vaccine (ADAcel) 0.5 milliLiter(s) IntraMuscular once  ferrous    sulfate 325 milliGRAM(s) Oral daily  heparin   Injectable 5000 Unit(s) SubCutaneous every 12 hours  ibuprofen  Tablet. 600 milliGRAM(s) Oral every 6 hours  prenatal multivitamin 1 Tablet(s) Oral daily    MEDICATIONS  (PRN):  diphenhydrAMINE 25 milliGRAM(s) Oral every 6 hours PRN Itching  lanolin Ointment 1 Application(s) Topical every 6 hours PRN Sore Nipples  magnesium hydroxide Suspension 30 milliLiter(s) Oral two times a day PRN Constipation  ondansetron Injectable 4 milliGRAM(s) IV Push every 6 hours PRN Nausea  oxyCODONE    IR 5 milliGRAM(s) Oral once PRN Moderate to Severe Pain (4-10)  oxyCODONE    IR 5 milliGRAM(s) Oral every 3 hours PRN Moderate to Severe Pain (4-10)  senna 1 Tablet(s) Oral two times a day PRN Constipation  simethicone 80 milliGRAM(s) Chew every 4 hours PRN Gas        ASSESSMENT:    35y     G   2   P   0201      PO Day#  2        Delivery: Primary [  ]    Repeat [ X ]       QBL - 511                                  Indication of procedure: Scheduled, had a prior classical C/S at 26 wks gest, now at 36+ wks.    Condition: Stable    Past Medical History significant for: HPI:  Patient is a 36yo  @ 36+4 presents for a scheduled  for history of classical  '. Denies ctx, lof, vb & endorses +FM. PNC c/b +COVID in March. Patient also with pituitary microadenoma tumor dx with last pregnancy. PNC otherwise uncomplicated. Patient accepts blood transfusion.     POBHx: 2019 Classical  at 26 weeks - baby with severe IUGR -  @ 5.5months at [facility]   GynHx: denies  MedHx: pituitary microadenoma   NKDA  Meds: PNV, Carbergoline - not currently taking, Clinda lotion   Social: denies    VS  T(C): --  HR: --  BP: --  RR: --  SpO2: --    comfortable  abd soft gravid    EFM/[place] pending (07 Aug 2020 07:13)      Current Issues:    Breasts:  Soft [x  ]   Engorged [  ]  Nipples:  Abdomen: Soft [ x ]   Distended [  ] Nontender [  ]     Bowel sounds :  Present [  ]  Absent [  ]   Fundus:  Firm [x  ]  Boggy [  ]    Abdominal incision: Clean, Dry and Intact [x  ]  Staples [  ] Steri Strips [ X ] Dermabond [  ] Sutures [  ]    Patient wearing abdominal binder for support.    Vaginal: Lochia:  Heavy [  ]  Moderate [ x ]   Scant [  ]    Extremities: Edema [  ] Negative Alicia's Sign [  ] Nontender Dandre  [ x ] Positive pedal pulses [  ]    Other relevant physical exam findings:      PLAN:    Plan: Increase ambulation, analgesia PRN and pain medication protocol standing oxycodone, ibuprofen and acetaminophen.    Diet: Regular diet    Continue routine post-operative and postpartum care.  SW Consult for a Prior  Loss at 5 1/2 months ( was a 26 wk delivery/severe IUGR)    Discharge Planning [ x ]    For discharge Today  [  X  ]    Consults:  Social Work [ X ]  Lactation [ x ]  Other [         ]

## 2020-08-09 NOTE — PROGRESS NOTE ADULT - SUBJECTIVE AND OBJECTIVE BOX
Postpartum Note,  Section  She is a  35y woman who is now post-operative day 2 s/p RLTCS.     Subjective:  The patient feels well.  She is ambulating.   She is tolerating regular diet.  She denies nausea and vomiting.  She is voiding.  Her pain is controlled.  She reports normal postpartum bleeding.  She is expressing breast milk    Physical exam:    Vital Signs Last 24 Hrs  T(C): 36.7 (09 Aug 2020 06:00), Max: 37 (08 Aug 2020 14:05)  T(F): 98 (09 Aug 2020 06:00), Max: 98.6 (08 Aug 2020 14:05)  HR: 64 (09 Aug 2020 06:00) (64 - 88)  BP: 105/69 (09 Aug 2020 06:00) (105/69 - 109/65)  BP(mean): --  RR: 16 (09 Aug 2020 06:00) (16 - 16)  SpO2: 99% (09 Aug 2020 06:00) (96% - 99%)    Gen: NAD  Breast: Soft, nontender, not engorged.  Abdomen: Soft, nontender, no distension , firm uterine fundus at umbilicus.  Incision: Clean, dry, and intact with steri strips  Pelvic: Normal lochia noted  Ext: No calf tenderness    LABS:                        9.1    9.41  )-----------( 214      ( 08 Aug 2020 06:39 )             27.4       Rubella status:     Allergies    No Known Allergies    Intolerances      MEDICATIONS  (STANDING):  acetaminophen   Tablet .. 975 milliGRAM(s) Oral <User Schedule>  diphtheria/tetanus/pertussis (acellular) Vaccine (ADAcel) 0.5 milliLiter(s) IntraMuscular once  ferrous    sulfate 325 milliGRAM(s) Oral daily  heparin   Injectable 5000 Unit(s) SubCutaneous every 12 hours  ibuprofen  Tablet. 600 milliGRAM(s) Oral every 6 hours  prenatal multivitamin 1 Tablet(s) Oral daily    MEDICATIONS  (PRN):  diphenhydrAMINE 25 milliGRAM(s) Oral every 6 hours PRN Itching  lanolin Ointment 1 Application(s) Topical every 6 hours PRN Sore Nipples  magnesium hydroxide Suspension 30 milliLiter(s) Oral two times a day PRN Constipation  ondansetron Injectable 4 milliGRAM(s) IV Push every 6 hours PRN Nausea  oxyCODONE    IR 5 milliGRAM(s) Oral once PRN Moderate to Severe Pain (4-10)  oxyCODONE    IR 5 milliGRAM(s) Oral every 3 hours PRN Moderate to Severe Pain (4-10)  senna 1 Tablet(s) Oral two times a day PRN Constipation  simethicone 80 milliGRAM(s) Chew every 4 hours PRN Gas        Assessment and Plan  POD #2 s/p  section  Doing well.  Encourage ambulation  Consider discharge planning for POD #3

## 2020-08-10 RX ADMIN — Medication 975 MILLIGRAM(S): at 21:41

## 2020-08-10 RX ADMIN — OXYCODONE HYDROCHLORIDE 5 MILLIGRAM(S): 5 TABLET ORAL at 05:12

## 2020-08-10 RX ADMIN — OXYCODONE HYDROCHLORIDE 5 MILLIGRAM(S): 5 TABLET ORAL at 13:20

## 2020-08-10 RX ADMIN — HEPARIN SODIUM 5000 UNIT(S): 5000 INJECTION INTRAVENOUS; SUBCUTANEOUS at 18:21

## 2020-08-10 RX ADMIN — Medication 975 MILLIGRAM(S): at 22:30

## 2020-08-10 RX ADMIN — OXYCODONE HYDROCHLORIDE 5 MILLIGRAM(S): 5 TABLET ORAL at 21:41

## 2020-08-10 RX ADMIN — OXYCODONE HYDROCHLORIDE 5 MILLIGRAM(S): 5 TABLET ORAL at 12:24

## 2020-08-10 RX ADMIN — OXYCODONE HYDROCHLORIDE 5 MILLIGRAM(S): 5 TABLET ORAL at 22:30

## 2020-08-10 RX ADMIN — OXYCODONE HYDROCHLORIDE 5 MILLIGRAM(S): 5 TABLET ORAL at 05:42

## 2020-08-10 RX ADMIN — Medication 600 MILLIGRAM(S): at 05:12

## 2020-08-10 RX ADMIN — Medication 600 MILLIGRAM(S): at 13:20

## 2020-08-10 RX ADMIN — Medication 600 MILLIGRAM(S): at 18:21

## 2020-08-10 RX ADMIN — Medication 600 MILLIGRAM(S): at 05:42

## 2020-08-10 RX ADMIN — HEPARIN SODIUM 5000 UNIT(S): 5000 INJECTION INTRAVENOUS; SUBCUTANEOUS at 05:11

## 2020-08-10 RX ADMIN — Medication 600 MILLIGRAM(S): at 12:23

## 2020-08-10 NOTE — PROGRESS NOTE ADULT - SUBJECTIVE AND OBJECTIVE BOX
Pateint offers no complaints   Ambualting  Tolerating regular diet  Voiding  Wants to be discharged home tommorrow    Gen: NAD, A&O x 3. Comfortably having lunch in room   Breast: B/l symmetrical with no abnormalities, non engorged  Fundus: Firm  Inc: c/d/i  VE: mild lochia  Ext: LE b/l no edema, no calf tenderness      Vital Signs Last 24 Hrs  T(C): 36.7 (10 Aug 2020 05:28), Max: 36.7 (09 Aug 2020 21:29)  T(F): 98 (10 Aug 2020 05:28), Max: 98 (09 Aug 2020 21:29)  HR: 71 (10 Aug 2020 05:28) (71 - 71)  BP: 118/73 (10 Aug 2020 05:28) (116/74 - 118/73)  RR: 18 (10 Aug 2020 05:28) (17 - 18)  SpO2: 99% (10 Aug 2020 05:28) (99% - 100%)        Assessment: 36yo  s/p repeat  delivery. POD 3  Stable. Afebrile      Plan  -Continue ambulating, regular diet  -DVT prophylaxis SCDs in bed  Anticipate discharge home  tomorrow      GRABIEL Fry MD [facility]

## 2020-08-10 NOTE — PROGRESS NOTE ADULT - SUBJECTIVE AND OBJECTIVE BOX
Patient assessed at 0808.  Subjective  Pain: Patient denies any pain at the time of assessment. Pain being managed well by pain management protocol.  Complaints: Patient denies any headache, blur vision, dizziness and/or weakness/fatigue. Patient reports left intermittent sharp abdominal pain that occurred yesterday that was self-limiting. Patient denies any nausea/vomiting, negative bowel movement with no urge, any burning on urination and/or difficulties voiding. Paitent denies any sadness due to history of 26week delivered passed away at 5months.  Infant feeding: breastfeeding and using breast pump  Feeding related issues and/or concerns: infant in the NICU        Vitals  T(C): 36.7 (08-10-20 @ 05:28), Max: 36.7 (20 @ 21:29)  HR: 71 (08-10-20 @ 05:28) (71 - 71)  BP: 118/73 (08-10-20 @ 05:28) (116/74 - 118/73)  RR: 18 (08-10-20 @ 05:28) (17 - 18)  SpO2: 99% (08-10-20 @ 05:28) (99% - 100%)  Wt(kg): --      LABS:             9.1    9.41  )-----------( 214      ( 08 Aug 2020 06:39 )             27.4       Blood Type: A Positive    RPR: Negative    COVID-19 negative          MEDICATIONS  (STANDING):  acetaminophen   Tablet .. 975 milliGRAM(s) Oral <User Schedule>  diphtheria/tetanus/pertussis (acellular) Vaccine (ADAcel) 0.5 milliLiter(s) IntraMuscular once  ferrous    sulfate 325 milliGRAM(s) Oral daily  heparin   Injectable 5000 Unit(s) SubCutaneous every 12 hours  ibuprofen  Tablet. 600 milliGRAM(s) Oral every 6 hours  prenatal multivitamin 1 Tablet(s) Oral daily    MEDICATIONS  (PRN):  diphenhydrAMINE 25 milliGRAM(s) Oral every 6 hours PRN Itching  lanolin Ointment 1 Application(s) Topical every 6 hours PRN Sore Nipples  magnesium hydroxide Suspension 30 milliLiter(s) Oral two times a day PRN Constipation  ondansetron Injectable 4 milliGRAM(s) IV Push every 6 hours PRN Nausea  oxyCODONE    IR 5 milliGRAM(s) Oral once PRN Moderate to Severe Pain (4-10)  oxyCODONE    IR 5 milliGRAM(s) Oral every 3 hours PRN Moderate to Severe Pain (4-10)  senna 1 Tablet(s) Oral two times a day PRN Constipation  simethicone 80 milliGRAM(s) Chew every 4 hours PRN Gas          36y/o     Day#2   repeat post-operative  section delivery                           Condition: Stable  Past Medical/Surgical/OB/GYN History significant for:  section 2019 at 26weeks severe IUGR infant  at 5months, pituitary microadenoma benign, COVID-19 positive in 2020,    Current Issues: EBL 511cc, slight anemia  Alert and orientedx3. Out of bed ambulating. Positive flatus. Negative bowel movement, denies urge. Voiding.  Tolerating a regular diet.  Lung sounds clear bilaterally.  Breasts: soft, nontender  Nipples: intact  Abdomen: Soft, nondistended and nontender. Bowel sounds present. Fundus firm. No rebound tender noted on palpitation of left abdomen.   Abdominal incision: Clean, dry and intact with steri strips. Patient wearing abdominal binder  Vaginal: Lochia light rubra  Extremities: Moderate edema noted bilaterally and equal to lower extremities, nontender with no erythremic areas noted. Positive pedal pulses. No palpable veins noted  Other relevant physical exam findings: none          Plan  Continue routine post-operative and postpartum care  Increase ambulation, analgesia PRN and pain medication protocol of standing ibuprofen and acetaminophen and oxycodone prn  Encouraged to use incentive spirometer  Discussed breast/nipple care, breastfeeding and using breast pump.   Discussed iron supplementation, increase hydration, dietary implementation due to slight anemia. Patient assessed at 0808.  Subjective  Pain: Patient denies any pain at the time of assessment. Pain being managed well by pain management protocol.  Complaints: Patient denies any headache, blur vision, dizziness and/or weakness/fatigue. Patient reports left intermittent sharp abdominal pain that occurred yesterday that was self-limiting. Patient denies any nausea/vomiting, negative bowel movement with no urge, any burning on urination and/or difficulties voiding. Paitent denies any sadness due to history of 26week delivered passed away at 5months.  Infant feeding: breastfeeding and using breast pump  Feeding related issues and/or concerns: infant in the NICU        Vitals  T(C): 36.7 (08-10-20 @ 05:28), Max: 36.7 (20 @ 21:29)  HR: 71 (08-10-20 @ 05:28) (71 - 71)  BP: 118/73 (08-10-20 @ 05:28) (116/74 - 118/73)  RR: 18 (08-10-20 @ 05:28) (17 - 18)  SpO2: 99% (08-10-20 @ 05:28) (99% - 100%)  Wt(kg): --      LABS:             9.1    9.41  )-----------( 214      ( 08 Aug 2020 06:39 )             27.4       Blood Type: A Positive    RPR: Negative    COVID-19 negative          MEDICATIONS  (STANDING):  acetaminophen   Tablet .. 975 milliGRAM(s) Oral <User Schedule>  diphtheria/tetanus/pertussis (acellular) Vaccine (ADAcel) 0.5 milliLiter(s) IntraMuscular once  ferrous    sulfate 325 milliGRAM(s) Oral daily  heparin   Injectable 5000 Unit(s) SubCutaneous every 12 hours  ibuprofen  Tablet. 600 milliGRAM(s) Oral every 6 hours  prenatal multivitamin 1 Tablet(s) Oral daily    MEDICATIONS  (PRN):  diphenhydrAMINE 25 milliGRAM(s) Oral every 6 hours PRN Itching  lanolin Ointment 1 Application(s) Topical every 6 hours PRN Sore Nipples  magnesium hydroxide Suspension 30 milliLiter(s) Oral two times a day PRN Constipation  ondansetron Injectable 4 milliGRAM(s) IV Push every 6 hours PRN Nausea  oxyCODONE    IR 5 milliGRAM(s) Oral once PRN Moderate to Severe Pain (4-10)  oxyCODONE    IR 5 milliGRAM(s) Oral every 3 hours PRN Moderate to Severe Pain (4-10)  senna 1 Tablet(s) Oral two times a day PRN Constipation  simethicone 80 milliGRAM(s) Chew every 4 hours PRN Gas          36y/o     Day#3   repeat post-operative  section delivery                           Condition: Stable  Past Medical/Surgical/OB/GYN History significant for:  section 2019 at 26weeks severe IUGR infant  at 5months, pituitary microadenoma benign, COVID-19 positive in 2020,    Current Issues: EBL 511cc, slight anemia  Alert and orientedx3. Out of bed ambulating. Positive flatus. Negative bowel movement, denies urge. Voiding.  Tolerating a regular diet.  Lung sounds clear bilaterally.  Breasts: soft, nontender  Nipples: intact  Abdomen: Soft, nondistended and nontender. Bowel sounds present. Fundus firm. No rebound tender noted on palpitation of left abdomen.   Abdominal incision: Clean, dry and intact with steri strips. Patient wearing abdominal binder  Vaginal: Lochia light rubra  Extremities: Moderate edema noted bilaterally and equal to lower extremities, nontender with no erythremic areas noted. Positive pedal pulses. No palpable veins noted  Other relevant physical exam findings: none          Plan  Continue routine post-operative and postpartum care  Increase ambulation, analgesia PRN and pain medication protocol of standing ibuprofen and acetaminophen and oxycodone prn  Encouraged to use incentive spirometer  Discussed breast/nipple care, breastfeeding and using breast pump.   Discussed iron supplementation, increase hydration, dietary implementation due to slight anemia.

## 2020-08-11 VITALS
TEMPERATURE: 98 F | SYSTOLIC BLOOD PRESSURE: 104 MMHG | RESPIRATION RATE: 18 BRPM | OXYGEN SATURATION: 98 % | HEART RATE: 63 BPM | DIASTOLIC BLOOD PRESSURE: 65 MMHG

## 2020-08-11 RX ORDER — OXYCODONE HYDROCHLORIDE 5 MG/1
1 TABLET ORAL
Qty: 12 | Refills: 0
Start: 2020-08-11 | End: 2020-08-13

## 2020-08-11 RX ADMIN — Medication 600 MILLIGRAM(S): at 13:15

## 2020-08-11 RX ADMIN — Medication 600 MILLIGRAM(S): at 05:39

## 2020-08-11 RX ADMIN — Medication 600 MILLIGRAM(S): at 06:31

## 2020-08-11 RX ADMIN — HEPARIN SODIUM 5000 UNIT(S): 5000 INJECTION INTRAVENOUS; SUBCUTANEOUS at 05:38

## 2020-08-11 RX ADMIN — Medication 600 MILLIGRAM(S): at 12:41

## 2020-08-11 NOTE — PROGRESS NOTE ADULT - SUBJECTIVE AND OBJECTIVE BOX
Patient assessed at 0748.  Subjective  Pain: Patient denies any pain at the time of assessment. Pain being managed well by pain management protocol.  Complaints: Patient denies any headache, blur vision, dizziness and/or weakness/fatigue. Patient reports left intermittent sharp abdominal pain that occurred is self-limiting after voiding. Patient denies any nausea/vomiting, negative bowel movement with no urge, any burning on urination and/or difficulties voiding. Paitent denies any sadness due to history of 26week delivered passed away at 5months. Patient reports being discharge to home today and to be remain in NICU.  Infant feeding: breastfeeding and using breast pump  Feeding related issues and/or concerns: infant in the NICU      Vital Signs Last 24 Hrs  T(C): 36.7 (11 Aug 2020 05:49), Max: 36.9 (10 Aug 2020 15:26)  T(F): 98.1 (11 Aug 2020 05:49), Max: 98.4 (10 Aug 2020 15:26)  HR: 65 (11 Aug 2020 05:49) (65 - 77)  BP: 118/82 (11 Aug 2020 05:49) (113/80 - 126/73)  BP(mean): --  RR: 18 (11 Aug 2020 05:49) (17 - 19)  SpO2: 99% (11 Aug 2020 05:49) (99% - 100%)      LABS:               9.1    9.41  )-----------( 214      ( 08 Aug 2020 06:39 )             27.4       Blood Type: A Positive    RPR: Negative    COVID-19 negative          MEDICATIONS  (STANDING):  acetaminophen   Tablet .. 975 milliGRAM(s) Oral <User Schedule>  diphtheria/tetanus/pertussis (acellular) Vaccine (ADAcel) 0.5 milliLiter(s) IntraMuscular once  ferrous    sulfate 325 milliGRAM(s) Oral daily  heparin   Injectable 5000 Unit(s) SubCutaneous every 12 hours  ibuprofen  Tablet. 600 milliGRAM(s) Oral every 6 hours  prenatal multivitamin 1 Tablet(s) Oral daily    MEDICATIONS  (PRN):  diphenhydrAMINE 25 milliGRAM(s) Oral every 6 hours PRN Itching  lanolin Ointment 1 Application(s) Topical every 6 hours PRN Sore Nipples  magnesium hydroxide Suspension 30 milliLiter(s) Oral two times a day PRN Constipation  oxyCODONE    IR 5 milliGRAM(s) Oral once PRN Moderate to Severe Pain (4-10)  oxyCODONE    IR 5 milliGRAM(s) Oral every 3 hours PRN Moderate to Severe Pain (4-10)  senna 1 Tablet(s) Oral two times a day PRN Constipation  simethicone 80 milliGRAM(s) Chew every 4 hours PRN Gas            34y/o     Day#4   repeat post-operative  section delivery                           Condition: Stable  Past Medical/Surgical/OB/GYN History significant for:  section 2019 at 26weeks severe IUGR infant  at 5months, pituitary microadenoma benign, COVID-19 positive in 2020,    Current Issues: EBL 511cc, slight anemia  Alert and orientedx3. Out of bed ambulating. Positive flatus. Negative bowel movement, denies urge. Voiding.  Tolerating a regular diet.  Lung sounds clear bilaterally.  Breasts: slightly benoit, nontender  Nipples: intact  Abdomen: Soft, nondistended and nontender. Bowel sounds present. Fundus firm. No rebound tender noted on palpitation of left abdomen.   Abdominal incision: Clean, dry and intact with steri strips. Patient wearing abdominal binder  Vaginal: Lochia light rubra  Extremities: Moderate edema noted bilaterally and equal to lower extremities, nontender with no erythremic areas noted. Positive pedal pulses. No palpable veins noted  Other relevant physical exam findings: none          Plan  Continue routine post-operative and postpartum care  Increase ambulation, analgesia PRN and pain medication protocol of standing ibuprofen and acetaminophen and oxycodone prn  Encouraged to use incentive spirometer  Discussed breast/nipple care, breastfeeding and using breast pump.   Discussed iron supplementation, increase hydration, dietary implementation due to slight anemia.  Discussed constipation relieving measures.  Discharge to home today. Discussed discharge planning.

## 2020-09-14 ENCOUNTER — APPOINTMENT (OUTPATIENT)
Dept: OBGYN | Facility: CLINIC | Age: 35
End: 2020-09-14
Payer: COMMERCIAL

## 2020-09-14 VITALS
HEART RATE: 52 BPM | WEIGHT: 179 LBS | DIASTOLIC BLOOD PRESSURE: 89 MMHG | SYSTOLIC BLOOD PRESSURE: 138 MMHG | HEIGHT: 63 IN | TEMPERATURE: 98.3 F | BODY MASS INDEX: 31.71 KG/M2

## 2020-09-14 PROCEDURE — 0503F POSTPARTUM CARE VISIT: CPT

## 2020-09-14 NOTE — HISTORY OF PRESENT ILLNESS
[Postpartum Follow Up] : postpartum follow up [Delivery Date: ___] : on [unfilled] [Female] : Delivery History: baby girl [Pertussis Vaccine] : Pertussis vaccine administered [Rubella Vaccine] : Rubella vaccine administered [Girl] : baby is a girl [Infant's Name ___] : [unfilled] [Living at Home] : is currently living at home [Breastfeeding] : currently nursing [Complications:___] : no complications [Repeat C/S] : delivered by  section (repeat) [Wt. ___] : weighing [unfilled] [Rhogam] : Rhogam was not administered [Resumed [place]] : has not resumed intercourse [Resumed Menses] : has not resumed her menses [Intended Contraception] : the patient does not intended to use contraception postpartum [Clean/Dry/Intact] : clean, dry and intact [Erythema] : not erythematous [Swelling] : not swollen [Healed] : healed [Dehiscence] : not dehisced [Back to Normal] : is back to normal in size [Normal] : the vagina was normal [Cervix Sample Taken] : cervical sample not taken for a Pap smear [Doing Well] : is doing well [None] : None [Examination Of The Breasts] : breasts are normal [de-identified] : 5 weeks PP s/p RLST C/S  at 37 weeks, for hx of prior classical c/s . Macroprolactinoma,  [FreeTextEntry8] : 35y.o. now P 1111 s/p repeat  C/S 8/7/20 live female, " Any Rose" , @ 37 weeks, for history of prior classical C/S. pt has hx of prolactinoma, , previously on Cabergoline , saw Neurosurgeon 2 weeks ago .for pre-surgical consultation on removal of prolactinoma. pt is now 5 weeks 3 days PP.  [de-identified] : pt to f/u with Neurosurgeon in Oct. for repeat MRI and then planning for surgery pt will continue breastfeeding until that time .

## 2020-12-21 PROBLEM — Z01.419 ENCOUNTER FOR ROUTINE GYNECOLOGICAL EXAMINATION: Status: RESOLVED | Noted: 2019-09-09 | Resolved: 2020-12-21

## 2021-07-13 NOTE — OB PST NOTE - NSANTHOSAYNRD_GEN_A_CORE
"I called pt with some recommendations from Dr. Lubin, \"Please let her know she should not use Nurtec. She is ok to use propranolol and she can use triptans (eletriptan) if she needs or just try Tylenol is she gets a headache.\".     Pt has stopped nurtec and is currently weaning of propranolol per recommendation from OBGYN. I let her know she may have some increase in headache as she weans of the propranolol and to use eletriptan or tylenol prn. Pt is comfortable with this plan. We will discuss treatment plan at follow up in august.     Tiffany EASTON  " No. CHRISTIANA screening performed.  STOP BANG Legend: 0-2 = LOW Risk; 3-4 = INTERMEDIATE Risk; 5-8 = HIGH Risk

## 2022-01-07 ENCOUNTER — APPOINTMENT (OUTPATIENT)
Dept: OBGYN | Facility: CLINIC | Age: 37
End: 2022-01-07
Payer: COMMERCIAL

## 2022-01-07 VITALS
SYSTOLIC BLOOD PRESSURE: 118 MMHG | DIASTOLIC BLOOD PRESSURE: 78 MMHG | HEIGHT: 63 IN | TEMPERATURE: 97.6 F | WEIGHT: 196 LBS | BODY MASS INDEX: 34.73 KG/M2 | HEART RATE: 71 BPM

## 2022-01-07 PROCEDURE — 99395 PREV VISIT EST AGE 18-39: CPT

## 2022-01-07 NOTE — DISCUSSION/SUMMARY
[FreeTextEntry1] : A - WWV\par       Prolactinoma/Hyperprolactinemia\par \par p- f/u 1 year\par     pap done\par     exercise encouraged \par       nutritional counseling provided\par    f/u with Endocrinologist and Neurosurgeon prn.

## 2022-01-07 NOTE — HISTORY OF PRESENT ILLNESS
[FreeTextEntry1] : 36 y.o. P 1111  LNMP  12/15/21 for annual exam. pt feels well. PMH - Prolactinoma  followed by Neurosurgeon, pt has surgical consultation, and it was determined that it did not require surgical extirpation. pt is followed by Endocrinology as well. , and plan is for Cabergoline management. ( not on it currently). . pt is taking only Vit. D. \par pt reports no other new developments related to , SH, or PMH/PSHx.  pt has two pat. aunts with breast cancer \par pt is vaccinated and boosted against COVID. \par pt is not on any contraception, pt is contemplating conception again.

## 2022-01-07 NOTE — PHYSICAL EXAM
[Appropriately responsive] : appropriately responsive [Alert] : alert [No Acute Distress] : no acute distress [No Lymphadenopathy] : no lymphadenopathy [Regular Rate Rhythm] : regular rate rhythm [No Murmurs] : no murmurs [Clear to Auscultation B/L] : clear to auscultation bilaterally [Soft] : soft [Non-tender] : non-tender [Non-distended] : non-distended [No HSM] : No HSM [No Lesions] : no lesions [No Mass] : no mass [Oriented x3] : oriented x3 [Examination Of The Breasts] : a normal appearance [Breast Abnormal Lactation (Galactorrhea)] : galactorrhea [No Masses] : no breast masses were palpable [Labia Majora] : normal [Labia Minora] : normal [Normal] : normal [Anteversion] : anteverted [Uterine Adnexae] : normal

## 2022-01-10 LAB — HPV HIGH+LOW RISK DNA PNL CVX: DETECTED

## 2022-01-13 ENCOUNTER — NON-APPOINTMENT (OUTPATIENT)
Age: 37
End: 2022-01-13

## 2022-01-13 LAB — CYTOLOGY CVX/VAG DOC THIN PREP: ABNORMAL

## 2022-01-14 ENCOUNTER — NON-APPOINTMENT (OUTPATIENT)
Age: 37
End: 2022-01-14

## 2022-02-09 ENCOUNTER — APPOINTMENT (OUTPATIENT)
Dept: OBGYN | Facility: CLINIC | Age: 37
End: 2022-02-09
Payer: COMMERCIAL

## 2022-02-09 VITALS
TEMPERATURE: 97.5 F | HEART RATE: 78 BPM | DIASTOLIC BLOOD PRESSURE: 76 MMHG | HEIGHT: 63 IN | WEIGHT: 194 LBS | SYSTOLIC BLOOD PRESSURE: 118 MMHG | BODY MASS INDEX: 34.38 KG/M2

## 2022-02-09 LAB
HCG UR QL: NEGATIVE
QUALITY CONTROL: YES

## 2022-02-09 PROCEDURE — 57456 ENDOCERV CURETTAGE W/SCOPE: CPT

## 2022-02-09 NOTE — PROCEDURE
[Colposcopy] : Colposcopy  [Time out performed] : Pre-procedure time out performed.  Patient's name, date of birth and procedure confirmed. [Consent Obtained] : Consent obtained [Risks] : risks [Benefits] : benefits [Alternatives] : alternatives [Patient] : patient [Infection] : infection [Bleeding] : bleeding [Allergic Reaction] : allergic reaction [ASCUS] : ASCUS [HPV High Risk] : HPV high risk [No Premedication] : no premedication [Colposcopy Adequate] : colposcopy adequate [Pap Performed] : pap not performed [SCI Fully Visualized] : SCI not fully visualized [ECC Performed] : ECC performed [No Abnormalities] : no abnormalities [Biopsy] : biopsy not taken [Hemostasis Obtained] : Hemostasis obtained [Tolerated Well] : the patient tolerated the procedure well [de-identified] : no acetowhite with lugols

## 2022-02-17 ENCOUNTER — TRANSCRIPTION ENCOUNTER (OUTPATIENT)
Age: 37
End: 2022-02-17

## 2022-02-17 LAB — CORE LAB BIOPSY: NORMAL

## 2022-02-22 ENCOUNTER — NON-APPOINTMENT (OUTPATIENT)
Age: 37
End: 2022-02-22

## 2022-09-09 ENCOUNTER — NON-APPOINTMENT (OUTPATIENT)
Age: 37
End: 2022-09-09

## 2022-09-16 ENCOUNTER — APPOINTMENT (OUTPATIENT)
Dept: OBGYN | Facility: CLINIC | Age: 37
End: 2022-09-16

## 2023-01-10 ENCOUNTER — APPOINTMENT (OUTPATIENT)
Dept: OBGYN | Facility: CLINIC | Age: 38
End: 2023-01-10
Payer: COMMERCIAL

## 2023-01-10 ENCOUNTER — ASOB RESULT (OUTPATIENT)
Age: 38
End: 2023-01-10

## 2023-01-10 VITALS
HEIGHT: 63 IN | WEIGHT: 186 LBS | BODY MASS INDEX: 32.96 KG/M2 | SYSTOLIC BLOOD PRESSURE: 108 MMHG | HEART RATE: 73 BPM | DIASTOLIC BLOOD PRESSURE: 72 MMHG

## 2023-01-10 DIAGNOSIS — R87.610 ATYPICAL SQUAMOUS CELLS OF UNDETERMINED SIGNIFICANCE ON CYTOLOGIC SMEAR OF CERVIX (ASC-US): ICD-10-CM

## 2023-01-10 DIAGNOSIS — R87.810 ATYPICAL SQUAMOUS CELLS OF UNDETERMINED SIGNIFICANCE ON CYTOLOGIC SMEAR OF CERVIX (ASC-US): ICD-10-CM

## 2023-01-10 PROCEDURE — 76817 TRANSVAGINAL US OBSTETRIC: CPT

## 2023-01-10 PROCEDURE — 99395 PREV VISIT EST AGE 18-39: CPT

## 2023-01-10 NOTE — DISCUSSION/SUMMARY
[FreeTextEntry1] : A - WWV\par        prolactinoma\par        previous c/s x 2\par        +uCG\par       ASCUS/HRHPV\par       AMA\par \par P- sono today for confirmation of viability ( EGA 5.3 weeks  by dates)\par      serum PRL, HCG , progesterone, - done\par      pap and Gen probe done\par      pt needs MRI of sella \par \par Addendum - sono today shows Gest. sac and YS, no pole yet, f/u 1/23/23 for confirmation of viability, and initial prenatal labs and ordering of MRI of sella.

## 2023-01-10 NOTE — HISTORY OF PRESENT ILLNESS
[FreeTextEntry1] : 37 y.o. P 1111 LNMP 12/3/22, presents for annual exam. , pt feels well, , pt has known hx of a pituitary chromophobe adenoma, ( prolactinoma) , followed by a Neurosurgeon and and Endocrinologist, . pt has not had a recent MRI of brain . pt denies H/A's, or visual disturbances . pt is not on Cabergoline ,\par pt has hx  of ASCUS/HRHPv , s/p colpo with [facility]. \par pt has a history of a prior classical at 26 weeks, followed by a RLST C/S at 37 weeks. \par today uCG is positive, , pt is 5 weeks 3 days based on LMP

## 2023-01-11 LAB
C TRACH RRNA SPEC QL NAA+PROBE: NOT DETECTED
HCG SERPL-MCNC: 5333 MIU/ML
N GONORRHOEA RRNA SPEC QL NAA+PROBE: NOT DETECTED
PROGEST SERPL-MCNC: 45.8 NG/ML
PROLACTIN SERPL-MCNC: 28.8 NG/ML
SOURCE AMPLIFICATION: NORMAL

## 2023-01-13 LAB — HPV HIGH+LOW RISK DNA PNL CVX: DETECTED

## 2023-01-18 LAB — CYTOLOGY CVX/VAG DOC THIN PREP: ABNORMAL

## 2023-01-30 ENCOUNTER — ASOB RESULT (OUTPATIENT)
Age: 38
End: 2023-01-30

## 2023-01-30 ENCOUNTER — APPOINTMENT (OUTPATIENT)
Dept: OBGYN | Facility: CLINIC | Age: 38
End: 2023-01-30
Payer: COMMERCIAL

## 2023-01-30 VITALS
BODY MASS INDEX: 31.71 KG/M2 | SYSTOLIC BLOOD PRESSURE: 110 MMHG | DIASTOLIC BLOOD PRESSURE: 74 MMHG | WEIGHT: 179 LBS | HEIGHT: 63 IN | HEART RATE: 80 BPM

## 2023-01-30 DIAGNOSIS — Z32.01 ENCOUNTER FOR PREGNANCY TEST, RESULT POSITIVE: ICD-10-CM

## 2023-01-30 PROCEDURE — 99213 OFFICE O/P EST LOW 20 MIN: CPT

## 2023-01-30 PROCEDURE — 76817 TRANSVAGINAL US OBSTETRIC: CPT

## 2023-01-30 NOTE — DISCUSSION/SUMMARY
[FreeTextEntry1] : A - IUP at 8 weeks 2 days\par       Prolactinoma, w/ hyperprolactinemia\par       previous classical C/S , previous c/s x 2\par       AMA\par \par P- Endocrinology appt. tonight with Dr. Toro\par      MRI of brain and sella, this evening with Z-P\par      EDC 9/9/23,  \par     advised Vit. B6 for nausea\par     pt has PNV\par     f/u 1 month for NT and NIPS\par     initial prenatal labs today, and booklet  done and given \par

## 2023-01-30 NOTE — HISTORY OF PRESENT ILLNESS
[FreeTextEntry1] : pt presents for follow up for confirmation of viability, pt reports nausea but no real emesis. . sono today shows viable fetus , measuring 8 weeks 2 days, giving an EDC of 9/9/23, 2 small myomata. pt has hx of classical C/S and most recent C/S was aty 37 weeks. pt has hx of pituitary adenoma, , for which pt has appt. with Endocrinologist, Cortez Pereira M.D.,  pt has MRI of sella  scheduled today as well prior to her appt.  with Endo tonight at -P. \par will draw initial prenatal labs now.

## 2023-01-31 LAB
ABO + RH PNL BLD: NORMAL
BASOPHILS # BLD AUTO: 0.02 K/UL
BASOPHILS NFR BLD AUTO: 0.3 %
BLD GP AB SCN SERPL QL: NORMAL
EOSINOPHIL # BLD AUTO: 0.05 K/UL
EOSINOPHIL NFR BLD AUTO: 0.8 %
HBV SURFACE AG SER QL: NONREACTIVE
HCT VFR BLD CALC: 33.7 %
HCV AB SER QL: NONREACTIVE
HCV S/CO RATIO: 0.08 S/CO
HGB BLD-MCNC: 10.9 G/DL
HIV1+2 AB SPEC QL IA.RAPID: NONREACTIVE
IMM GRANULOCYTES NFR BLD AUTO: 0.2 %
LYMPHOCYTES # BLD AUTO: 2.32 K/UL
LYMPHOCYTES NFR BLD AUTO: 38.1 %
MAN DIFF?: NORMAL
MCHC RBC-ENTMCNC: 31.1 PG
MCHC RBC-ENTMCNC: 32.3 GM/DL
MCV RBC AUTO: 96.3 FL
MEV IGG FLD QL IA: 24.5 AU/ML
MEV IGG+IGM SER-IMP: POSITIVE
MONOCYTES # BLD AUTO: 0.46 K/UL
MONOCYTES NFR BLD AUTO: 7.6 %
NEUTROPHILS # BLD AUTO: 3.23 K/UL
NEUTROPHILS NFR BLD AUTO: 53 %
PLATELET # BLD AUTO: 327 K/UL
RBC # BLD: 3.5 M/UL
RBC # FLD: 12.4 %
RUBV IGG FLD-ACNC: 2.4 INDEX
RUBV IGG SER-IMP: POSITIVE
T PALLIDUM AB SER QL IA: NEGATIVE
VZV AB TITR SER: POSITIVE
VZV IGG SER IF-ACNC: 484.1 INDEX
WBC # FLD AUTO: 6.09 K/UL

## 2023-02-01 LAB
B19V IGG SER QL IA: 6.35 INDEX
B19V IGG+IGM SER-IMP: NORMAL
B19V IGG+IGM SER-IMP: POSITIVE
B19V IGM FLD-ACNC: 0.18 INDEX
B19V IGM SER-ACNC: NEGATIVE
BACTERIA UR CULT: NORMAL

## 2023-02-14 NOTE — OB PROVIDER DELIVERY SUMMARY - NSURGENCYA_OBGYN_ALL_OB
Individual Psychotherapy (PhD/LCSW)    The patient location is: [place], Scheduled

## 2023-03-01 ENCOUNTER — APPOINTMENT (OUTPATIENT)
Dept: OBGYN | Facility: CLINIC | Age: 38
End: 2023-03-01
Payer: COMMERCIAL

## 2023-03-01 VITALS
SYSTOLIC BLOOD PRESSURE: 107 MMHG | BODY MASS INDEX: 32.6 KG/M2 | DIASTOLIC BLOOD PRESSURE: 73 MMHG | HEIGHT: 63 IN | WEIGHT: 184 LBS

## 2023-03-01 PROCEDURE — 0501F PRENATAL FLOW SHEET: CPT

## 2023-03-09 ENCOUNTER — APPOINTMENT (OUTPATIENT)
Dept: ANTEPARTUM | Facility: CLINIC | Age: 38
End: 2023-03-09
Payer: COMMERCIAL

## 2023-03-09 ENCOUNTER — NON-APPOINTMENT (OUTPATIENT)
Age: 38
End: 2023-03-09

## 2023-03-09 ENCOUNTER — ASOB RESULT (OUTPATIENT)
Age: 38
End: 2023-03-09

## 2023-03-09 PROCEDURE — 76815 OB US LIMITED FETUS(S): CPT

## 2023-03-31 ENCOUNTER — ASOB RESULT (OUTPATIENT)
Age: 38
End: 2023-03-31

## 2023-03-31 ENCOUNTER — APPOINTMENT (OUTPATIENT)
Dept: ANTEPARTUM | Facility: CLINIC | Age: 38
End: 2023-03-31
Payer: COMMERCIAL

## 2023-03-31 PROCEDURE — 76817 TRANSVAGINAL US OBSTETRIC: CPT

## 2023-03-31 PROCEDURE — 76815 OB US LIMITED FETUS(S): CPT

## 2023-04-03 ENCOUNTER — APPOINTMENT (OUTPATIENT)
Dept: OBGYN | Facility: CLINIC | Age: 38
End: 2023-04-03
Payer: COMMERCIAL

## 2023-04-03 VITALS
SYSTOLIC BLOOD PRESSURE: 116 MMHG | DIASTOLIC BLOOD PRESSURE: 84 MMHG | HEART RATE: 75 BPM | WEIGHT: 192 LBS | BODY MASS INDEX: 34.02 KG/M2 | HEIGHT: 63 IN

## 2023-04-03 DIAGNOSIS — E22.1 HYPERPROLACTINEMIA: ICD-10-CM

## 2023-04-03 PROCEDURE — 0502F SUBSEQUENT PRENATAL CARE: CPT

## 2023-04-11 LAB
AFP MOM: 0.98
AFP VALUE: 37.9 NG/ML
ALPHA FETOPROTEIN SERUM COMMENT: NORMAL
ALPHA FETOPROTEIN SERUM INTERPRETATION: NORMAL
ALPHA FETOPROTEIN SERUM RESULTS: NORMAL
ALPHA FETOPROTEIN SERUM TEST RESULTS: NORMAL
GESTATIONAL AGE BASED ON: NORMAL
GESTATIONAL AGE ON COLLECTION DATE: 17.3 WEEKS
INSULIN DEP DIABETES: NO
MATERNAL AGE AT EDD AFP: 36.3 YR
MULTIPLE GESTATION: NO
OSBR RISK 1 IN: NORMAL
RACE: NORMAL
WEIGHT AFP: 192 LBS

## 2023-04-20 ENCOUNTER — TRANSCRIPTION ENCOUNTER (OUTPATIENT)
Age: 38
End: 2023-04-20

## 2023-04-21 ENCOUNTER — TRANSCRIPTION ENCOUNTER (OUTPATIENT)
Age: 38
End: 2023-04-21

## 2023-04-26 ENCOUNTER — ASOB RESULT (OUTPATIENT)
Age: 38
End: 2023-04-26

## 2023-04-26 ENCOUNTER — APPOINTMENT (OUTPATIENT)
Dept: ANTEPARTUM | Facility: CLINIC | Age: 38
End: 2023-04-26
Payer: COMMERCIAL

## 2023-04-26 ENCOUNTER — APPOINTMENT (OUTPATIENT)
Dept: OBGYN | Facility: CLINIC | Age: 38
End: 2023-04-26
Payer: COMMERCIAL

## 2023-04-26 VITALS
HEART RATE: 79 BPM | DIASTOLIC BLOOD PRESSURE: 68 MMHG | SYSTOLIC BLOOD PRESSURE: 96 MMHG | HEIGHT: 63 IN | BODY MASS INDEX: 34.2 KG/M2 | WEIGHT: 193 LBS

## 2023-04-26 PROCEDURE — 76817 TRANSVAGINAL US OBSTETRIC: CPT

## 2023-04-26 PROCEDURE — 0502F SUBSEQUENT PRENATAL CARE: CPT

## 2023-04-26 PROCEDURE — 76811 OB US DETAILED SNGL FETUS: CPT | Mod: 59

## 2023-05-31 ENCOUNTER — APPOINTMENT (OUTPATIENT)
Dept: OBGYN | Facility: CLINIC | Age: 38
End: 2023-05-31
Payer: COMMERCIAL

## 2023-05-31 VITALS
DIASTOLIC BLOOD PRESSURE: 70 MMHG | HEIGHT: 63 IN | HEART RATE: 83 BPM | WEIGHT: 200.4 LBS | SYSTOLIC BLOOD PRESSURE: 108 MMHG | BODY MASS INDEX: 35.51 KG/M2

## 2023-05-31 PROCEDURE — 0502F SUBSEQUENT PRENATAL CARE: CPT

## 2023-06-02 LAB
GLUCOSE 1H P 50 G GLC PO SERPL-MCNC: 80 MG/DL
T PALLIDUM AB SER QL IA: NEGATIVE

## 2023-06-20 ENCOUNTER — APPOINTMENT (OUTPATIENT)
Dept: OBGYN | Facility: CLINIC | Age: 38
End: 2023-06-20
Payer: COMMERCIAL

## 2023-06-20 VITALS
HEIGHT: 63 IN | BODY MASS INDEX: 36.04 KG/M2 | HEART RATE: 73 BPM | WEIGHT: 203.4 LBS | SYSTOLIC BLOOD PRESSURE: 108 MMHG | DIASTOLIC BLOOD PRESSURE: 71 MMHG

## 2023-06-20 PROCEDURE — 0502F SUBSEQUENT PRENATAL CARE: CPT

## 2023-07-05 ENCOUNTER — APPOINTMENT (OUTPATIENT)
Dept: OBGYN | Facility: CLINIC | Age: 38
End: 2023-07-05
Payer: COMMERCIAL

## 2023-07-05 VITALS
DIASTOLIC BLOOD PRESSURE: 78 MMHG | SYSTOLIC BLOOD PRESSURE: 115 MMHG | WEIGHT: 210 LBS | HEIGHT: 63 IN | BODY MASS INDEX: 37.21 KG/M2

## 2023-07-05 PROCEDURE — 90715 TDAP VACCINE 7 YRS/> IM: CPT

## 2023-07-05 PROCEDURE — 90471 IMMUNIZATION ADMIN: CPT

## 2023-07-05 PROCEDURE — 0502F SUBSEQUENT PRENATAL CARE: CPT

## 2023-07-26 ENCOUNTER — APPOINTMENT (OUTPATIENT)
Dept: ANTEPARTUM | Facility: CLINIC | Age: 38
End: 2023-07-26
Payer: COMMERCIAL

## 2023-07-26 ENCOUNTER — ASOB RESULT (OUTPATIENT)
Age: 38
End: 2023-07-26

## 2023-07-26 ENCOUNTER — APPOINTMENT (OUTPATIENT)
Dept: OBGYN | Facility: CLINIC | Age: 38
End: 2023-07-26
Payer: COMMERCIAL

## 2023-07-26 VITALS
HEART RATE: 66 BPM | HEIGHT: 63 IN | DIASTOLIC BLOOD PRESSURE: 74 MMHG | SYSTOLIC BLOOD PRESSURE: 115 MMHG | WEIGHT: 214 LBS | BODY MASS INDEX: 37.92 KG/M2

## 2023-07-26 DIAGNOSIS — Z34.92 ENCOUNTER FOR SUPERVISION OF NORMAL PREGNANCY, UNSPECIFIED, SECOND TRIMESTER: ICD-10-CM

## 2023-07-26 PROCEDURE — 76819 FETAL BIOPHYS PROFIL W/O NST: CPT | Mod: 59

## 2023-07-26 PROCEDURE — 0502F SUBSEQUENT PRENATAL CARE: CPT

## 2023-07-26 PROCEDURE — 76816 OB US FOLLOW-UP PER FETUS: CPT

## 2023-08-09 ENCOUNTER — APPOINTMENT (OUTPATIENT)
Dept: OBGYN | Facility: CLINIC | Age: 38
End: 2023-08-09
Payer: COMMERCIAL

## 2023-08-09 ENCOUNTER — OUTPATIENT (OUTPATIENT)
Dept: OUTPATIENT SERVICES | Facility: HOSPITAL | Age: 38
LOS: 1 days | End: 2023-08-09

## 2023-08-09 VITALS
DIASTOLIC BLOOD PRESSURE: 83 MMHG | TEMPERATURE: 98 F | HEART RATE: 76 BPM | OXYGEN SATURATION: 99 % | HEIGHT: 62.02 IN | WEIGHT: 218.04 LBS | SYSTOLIC BLOOD PRESSURE: 118 MMHG | RESPIRATION RATE: 18 BRPM

## 2023-08-09 VITALS
SYSTOLIC BLOOD PRESSURE: 131 MMHG | HEIGHT: 63 IN | HEART RATE: 76 BPM | WEIGHT: 215 LBS | BODY MASS INDEX: 38.09 KG/M2 | DIASTOLIC BLOOD PRESSURE: 80 MMHG

## 2023-08-09 DIAGNOSIS — Z34.90 ENCOUNTER FOR SUPERVISION OF NORMAL PREGNANCY, UNSPECIFIED, UNSPECIFIED TRIMESTER: ICD-10-CM

## 2023-08-09 DIAGNOSIS — Z98.891 HISTORY OF UTERINE SCAR FROM PREVIOUS SURGERY: ICD-10-CM

## 2023-08-09 DIAGNOSIS — Z98.891 HISTORY OF UTERINE SCAR FROM PREVIOUS SURGERY: Chronic | ICD-10-CM

## 2023-08-09 DIAGNOSIS — D35.2 BENIGN NEOPLASM OF PITUITARY GLAND: ICD-10-CM

## 2023-08-09 LAB
APPEARANCE UR: ABNORMAL
BILIRUB UR-MCNC: NEGATIVE — SIGNIFICANT CHANGE UP
BLD GP AB SCN SERPL QL: NEGATIVE — SIGNIFICANT CHANGE UP
COLOR SPEC: YELLOW — SIGNIFICANT CHANGE UP
DIFF PNL FLD: NEGATIVE — SIGNIFICANT CHANGE UP
GLUCOSE UR QL: NEGATIVE MG/DL — SIGNIFICANT CHANGE UP
HCT VFR BLD CALC: 33.5 % — LOW (ref 34.5–45)
HGB BLD-MCNC: 11.4 G/DL — LOW (ref 11.5–15.5)
KETONES UR-MCNC: NEGATIVE MG/DL — SIGNIFICANT CHANGE UP
LEUKOCYTE ESTERASE UR-ACNC: ABNORMAL
MCHC RBC-ENTMCNC: 33.5 PG — SIGNIFICANT CHANGE UP (ref 27–34)
MCHC RBC-ENTMCNC: 34 GM/DL — SIGNIFICANT CHANGE UP (ref 32–36)
MCV RBC AUTO: 98.5 FL — SIGNIFICANT CHANGE UP (ref 80–100)
NITRITE UR-MCNC: NEGATIVE — SIGNIFICANT CHANGE UP
NRBC # BLD: 0 /100 WBCS — SIGNIFICANT CHANGE UP (ref 0–0)
NRBC # FLD: 0 K/UL — SIGNIFICANT CHANGE UP (ref 0–0)
PH UR: 6 — SIGNIFICANT CHANGE UP (ref 5–8)
PLATELET # BLD AUTO: 253 K/UL — SIGNIFICANT CHANGE UP (ref 150–400)
PROT UR-MCNC: 30 MG/DL
RBC # BLD: 3.4 M/UL — LOW (ref 3.8–5.2)
RBC # FLD: 14 % — SIGNIFICANT CHANGE UP (ref 10.3–14.5)
RH IG SCN BLD-IMP: POSITIVE — SIGNIFICANT CHANGE UP
SP GR SPEC: 1.02 — SIGNIFICANT CHANGE UP (ref 1–1.03)
UROBILINOGEN FLD QL: 1 MG/DL — SIGNIFICANT CHANGE UP (ref 0.2–1)
WBC # BLD: 7.32 K/UL — SIGNIFICANT CHANGE UP (ref 3.8–10.5)
WBC # FLD AUTO: 7.32 K/UL — SIGNIFICANT CHANGE UP (ref 3.8–10.5)

## 2023-08-09 PROCEDURE — 0502F SUBSEQUENT PRENATAL CARE: CPT

## 2023-08-09 RX ORDER — IBUPROFEN 200 MG
1 TABLET ORAL
Qty: 0 | Refills: 0 | DISCHARGE

## 2023-08-09 NOTE — OB PST NOTE - NSICDXFAMILYHX_GEN_ALL_CORE_FT
FAMILY HISTORY:  Father  Still living? Yes, Estimated age: Age Unknown  Family history of hypertension in father, Age at diagnosis: Age Unknown    Grandparent  Still living? No  Family history of diabetes mellitus, Age at diagnosis: Age Unknown

## 2023-08-09 NOTE — OB PST NOTE - NSICDXPASTMEDICALHX_GEN_ALL_CORE_FT
PAST MEDICAL HISTORY:  H/O constipation     H/O nausea     Pituitary benign neoplasm     Pregnant

## 2023-08-09 NOTE — OB PST NOTE - NSHPPHYSICALEXAM_GEN_ALL_CORE
Constitutional: Well Developed, Well Groomed, Well Nourished, No Distress    Eyes: PERRL, EOMI, conjunctiva clear    Ears: Normal    Mouth & Gums: Normal, moist    Pharynx: No tenderness, discharge, or peritonsillar abscess    Tonsils: No Redness, discharge, tenderness, or swelling    Neck: Supple, no JVD, normal thyroid glands, no carotid bruits, no cervical vertebral or paraspinal tenderness    Breast: Normal shape, no masses, no tenderness, nipples normal, no nipple discharge    Back: Normal shape, ROM intact, strength intact, no vertebral tenderness    Respiratory: Airway patent, breath sounds equal, good air movement, respiration non-labored, clear to auscultation bilateral, no chest wall tenderness, no intercostal retractions, no rales, no wheezes, no rhonchi, no subcutaneous emphysema    Cardiovascular:  Regular rate and rhythm, no rubs or murmur, normal PMI    Gastrointestinal: Soft, non-tender, non distention, no masses palpable, bowel sound normal, no bruit, no rebound tenderness    Extremities: No clubbing, cyanosis, or pedal edema    Vascular:  Carotid Pulse normal , Radial Pulse normal, Femoral Pulse normal, DP pulse normal, PT pulse normal    Neurological: alert & oriented x 3, sensation intact, deep reflexes intact, cranial nerve intact, normal strength    Skin: warm and dry, normal color    Lymph Nodes: normal posterior cervical lymph node, normal anterior cervical lymph node, normal supraclavicular lymph node, normal axillary lymph node, normal inguinal lymph node, normal femoral lymph node    Musculoskeletal: ROM intact, no joint swelling, warmth, or calf tenderness. Normal strength    Psychiatric: normal affect, normal behavior Constitutional: Well Developed, Well Groomed, Well Nourished, No Distress    Eyes: PERRL, EOMI, conjunctiva clear    Ears: Normal    Mouth & Gums: Normal, moist    Pharynx: No tenderness, discharge    Tonsils: No Redness, discharge, tenderness, or swelling    Neck: Supple, normal thyroid gland    Breast: no tenderness    Back: ROM intact    Respiratory: CTA, no rales, no rhonchi, no wheezing    Cardiovascular:  Regular rate and rhythm, no rubs or murmur    Gastrointestinal: Soft, non-tender, non distention, no masses palpable, bowel sound normal    Extremities: No pedal edema    Vascular:  Radial Pulse normal    Neurological: alert & oriented x 3, sensation intact    Skin: slight pedal edema; skin warm and dry    Lymph Nodes: no enlarged lymph nodes    Musculoskeletal: ROM intact    Psychiatric: normal affect, normal behavior Constitutional: Well Developed, Well Groomed, Well Nourished, No Distress    Eyes: PERRL, EOMI, conjunctiva clear    Ears: Normal    Mouth & Gums: Normal, moist    Pharynx: No tenderness, discharge    Tonsils: No Redness, discharge, tenderness, or swelling    Neck: Supple, normal thyroid gland    Breast: no tenderness    Back: ROM intact    Respiratory: CTA, no rales, no rhonchi, no wheezing    Cardiovascular:  Regular rate and rhythm, no rubs or murmur    Gastrointestinal: Soft, non-tender, non distention, no masses palpable, bowel sound normal    Extremities: slight pedal edema    Vascular:  Radial Pulse normal    Neurological: alert & oriented x 3, sensation intact    Skin: warm and dry    Lymph Nodes: no enlarged lymph nodes    Musculoskeletal: ROM intact    Psychiatric: normal affect, normal behavior

## 2023-08-09 NOTE — OB PST NOTE - NSTRANFUSIONOBJECTION_GEN_ALL_CORE_SIUH
Patient has no objection to blood transfusions. Birth Control Pills Counseling: Birth Control Pill Counseling: I discussed with the patient the potential side effects of OCPs including but not limited to increased risk of stroke, heart attack, thrombophlebitis, deep venous thrombosis, hepatic adenomas, breast changes, GI upset, headaches, and depression.  The patient verbalized understanding of the proper use and possible adverse effects of OCPs. All of the patient's questions and concerns were addressed.

## 2023-08-09 NOTE — OB PST NOTE - NSANTHOSAYNRD_GEN_A_CORE
No. CHRISTIANA screening performed.  STOP BANG Legend: 0-2 = LOW Risk; 3-4 = INTERMEDIATE Risk; 5-8 = HIGH Risk

## 2023-08-09 NOTE — OB PST NOTE - PROBLEM SELECTOR PLAN 1
This is a 39 y/o female who is 35 weeks gestation and scheduled for repeat  on 23  * Given preop and cleanser instructions with good teach back and patient verbalized understanding   * Lab work included: t&s, cbc, ua  * Instructed to stop prenatal vitamins with last dose 23

## 2023-08-09 NOTE — OB PST NOTE - PROBLEM SELECTOR PLAN 2
Await last office note in July 2023 from endocrinologist, Dr. Toro regarding benign pituitary neoplasm

## 2023-08-09 NOTE — OB PST NOTE - NSHPREVIEWOFSYSTEMS_GEN_ALL_CORE
General: No fever, chills, sweating    Skin: No rashes, itching, or dryness    Breast: No tenderness     Ophthalmologic: wears glasses or contacts; No diplopia, photophobia    ENMT Symptoms: c/o nasal congestion;  No hearing difficulty    Respiratory and Thorax: No wheezing, dyspnea, cough; can climb stairs without SOB except for last 2 months of pregnancy -- 4 METS    Cardiovascular: No chest pain, palpitations, dyspnea on exertion; can climb stairs without SOB except for last 2 months of pregnancy -- 4 METS    Gastrointestinal: c/o occasional nausea and constipation; no vomiting, diarrhea, constipation, abdominal pain    Genitourinary/ Pelvis: No hematuria, renal colic, or flank pain    Musculoskeletal: No arthralgia    Neurological: c/o occasional headaches due to pituitary benign neoplasm but not during this pregnancy; No seizures, no cva    Psychiatric: No suicidal ideation, depression, anxiety    Hematology: No gum bleeding, nose bleeding    Lymphatic: pedal edema; No enlarged or tender lymph nodes    Endocrine: No heat or cold intolerance    Immunologic: No recurrent or persistent infections General: No fever, chills, sweating    Skin: No rashes, itching, or dryness    Breast: No tenderness     Ophthalmologic: wears glasses or contacts; No diplopia, photophobia    ENMT Symptoms: c/o nasal congestion;  No hearing difficulty    Respiratory and Thorax: No wheezing, dyspnea, cough; can climb stairs without SOB except for last 2 months of pregnancy -- 4 METS    Cardiovascular: No chest pain, palpitations, dyspnea on exertion; can climb stairs without SOB except for last 2 months of pregnancy -- 4 METS    Gastrointestinal: c/o occasional nausea and constipation; no vomiting, diarrhea, abdominal pain    Genitourinary/ Pelvis: No hematuria, renal colic, or flank pain    Musculoskeletal: No arthralgia    Neurological: c/o occasional headaches due to pituitary benign neoplasm but not during this pregnancy; No seizures, no cva    Psychiatric: No suicidal ideation, depression, anxiety    Hematology: No gum bleeding, nose bleeding    Lymphatic: pedal edema; No enlarged or tender lymph nodes    Endocrine: No heat or cold intolerance    Immunologic: No recurrent or persistent infections

## 2023-08-09 NOTE — OB PST NOTE - NSICDXPASTSURGICALHX_GEN_ALL_CORE_FT
PAST SURGICAL HISTORY:  H/O:  section 2019 at 26 weeks, IUGR, NRFHR-emergency c/s  baby passed away at 5.5 months

## 2023-08-09 NOTE — OB PST NOTE - HISTORY OF PRESENT ILLNESS
This is a 39 y/o female who presents at 35 weeks gestation with repeat  scheduled for 23 with ENEIDA 9.9-23. Last fetal movement few minutes ago; denies vaginal bleeding. This is a 37 y/o female who presents at 35 weeks gestation with repeat  scheduled for 23 with ENEIDA 9.9-23. Last fetal movement few minutes ago; denies vaginal bleeding.  pcp -- Catalina Gomez [phone]  endocrinologist monitoring pituitary (benign) tumor, Dr. Kelli Mayberry [phone] This is a 37 y/o female who presents at 35 weeks gestation with repeat  scheduled for 23 with ENEIDA 9.9-23. Last fetal movement few minutes ago; denies vaginal bleeding.  pcp -- Catalina Gomez [phone]  endocrinologist monitoring pituitary (benign) tumor, Dr. Kelli Toro [phone]

## 2023-08-17 NOTE — OB PROVIDER H&P - NSPPHNORISK_OBGYN_ALL_OB
In my judgment no risk for PPH has been identified at this time. Colchicine Counseling:  Patient counseled regarding adverse effects including but not limited to stomach upset (nausea, vomiting, stomach pain, or diarrhea).  Patient instructed to limit alcohol consumption while taking this medication.  Colchicine may reduce blood counts especially with prolonged use.  The patient understands that monitoring of kidney function and blood counts may be required, especially at baseline. The patient verbalized understanding of the proper use and possible adverse effects of colchicine.  All of the patient's questions and concerns were addressed.

## 2023-08-21 LAB — B-HEM STREP SPEC QL CULT: NORMAL

## 2023-08-24 ENCOUNTER — INPATIENT (INPATIENT)
Facility: HOSPITAL | Age: 38
LOS: 1 days | Discharge: HOME | End: 2023-08-26
Attending: OBSTETRICS & GYNECOLOGY | Admitting: OBSTETRICS & GYNECOLOGY
Payer: COMMERCIAL

## 2023-08-24 ENCOUNTER — APPOINTMENT (OUTPATIENT)
Dept: OBGYN | Facility: HOSPITAL | Age: 38
End: 2023-08-24

## 2023-08-24 ENCOUNTER — TRANSCRIPTION ENCOUNTER (OUTPATIENT)
Age: 38
End: 2023-08-24

## 2023-08-24 VITALS — SYSTOLIC BLOOD PRESSURE: 135 MMHG | DIASTOLIC BLOOD PRESSURE: 94 MMHG | HEART RATE: 71 BPM

## 2023-08-24 DIAGNOSIS — Z98.891 HISTORY OF UTERINE SCAR FROM PREVIOUS SURGERY: Chronic | ICD-10-CM

## 2023-08-24 DIAGNOSIS — Z98.891 HISTORY OF UTERINE SCAR FROM PREVIOUS SURGERY: ICD-10-CM

## 2023-08-24 LAB
ALBUMIN SERPL ELPH-MCNC: 4 G/DL — SIGNIFICANT CHANGE UP (ref 3.3–5)
ALP SERPL-CCNC: 227 U/L — HIGH (ref 40–120)
ALT FLD-CCNC: 6 U/L — SIGNIFICANT CHANGE UP (ref 4–33)
ANION GAP SERPL CALC-SCNC: 12 MMOL/L — SIGNIFICANT CHANGE UP (ref 7–14)
APPEARANCE UR: ABNORMAL
APTT BLD: 27.4 SEC — SIGNIFICANT CHANGE UP (ref 24.5–35.6)
AST SERPL-CCNC: 13 U/L — SIGNIFICANT CHANGE UP (ref 4–32)
BACTERIA # UR AUTO: ABNORMAL /HPF
BASOPHILS # BLD AUTO: 0.02 K/UL — SIGNIFICANT CHANGE UP (ref 0–0.2)
BASOPHILS NFR BLD AUTO: 0.3 % — SIGNIFICANT CHANGE UP (ref 0–2)
BILIRUB SERPL-MCNC: 0.2 MG/DL — SIGNIFICANT CHANGE UP (ref 0.2–1.2)
BILIRUB UR-MCNC: NEGATIVE — SIGNIFICANT CHANGE UP
BUN SERPL-MCNC: 15 MG/DL — SIGNIFICANT CHANGE UP (ref 7–23)
CALCIUM SERPL-MCNC: 9.2 MG/DL — SIGNIFICANT CHANGE UP (ref 8.4–10.5)
CAST: 0 /LPF — SIGNIFICANT CHANGE UP (ref 0–4)
CHLORIDE SERPL-SCNC: 104 MMOL/L — SIGNIFICANT CHANGE UP (ref 98–107)
CO2 SERPL-SCNC: 23 MMOL/L — SIGNIFICANT CHANGE UP (ref 22–31)
COLOR SPEC: YELLOW — SIGNIFICANT CHANGE UP
CREAT ?TM UR-MCNC: 78 MG/DL — SIGNIFICANT CHANGE UP
CREAT SERPL-MCNC: 0.73 MG/DL — SIGNIFICANT CHANGE UP (ref 0.5–1.3)
DIFF PNL FLD: NEGATIVE — SIGNIFICANT CHANGE UP
EGFR: 109 ML/MIN/1.73M2 — SIGNIFICANT CHANGE UP
EOSINOPHIL # BLD AUTO: 0.06 K/UL — SIGNIFICANT CHANGE UP (ref 0–0.5)
EOSINOPHIL NFR BLD AUTO: 0.8 % — SIGNIFICANT CHANGE UP (ref 0–6)
FIBRINOGEN PPP-MCNC: 528 MG/DL — HIGH (ref 200–465)
GLUCOSE SERPL-MCNC: 77 MG/DL — SIGNIFICANT CHANGE UP (ref 70–99)
GLUCOSE UR QL: NEGATIVE MG/DL — SIGNIFICANT CHANGE UP
HCT VFR BLD CALC: 34.3 % — LOW (ref 34.5–45)
HGB BLD-MCNC: 11.6 G/DL — SIGNIFICANT CHANGE UP (ref 11.5–15.5)
IANC: 4.7 K/UL — SIGNIFICANT CHANGE UP (ref 1.8–7.4)
IMM GRANULOCYTES NFR BLD AUTO: 0.9 % — SIGNIFICANT CHANGE UP (ref 0–0.9)
INR BLD: <0.9 RATIO — LOW (ref 0.85–1.18)
KETONES UR-MCNC: NEGATIVE MG/DL — SIGNIFICANT CHANGE UP
LDH SERPL L TO P-CCNC: 141 U/L — SIGNIFICANT CHANGE UP (ref 135–225)
LEUKOCYTE ESTERASE UR-ACNC: ABNORMAL
LYMPHOCYTES # BLD AUTO: 2.11 K/UL — SIGNIFICANT CHANGE UP (ref 1–3.3)
LYMPHOCYTES # BLD AUTO: 27.8 % — SIGNIFICANT CHANGE UP (ref 13–44)
MCHC RBC-ENTMCNC: 33.8 GM/DL — SIGNIFICANT CHANGE UP (ref 32–36)
MCHC RBC-ENTMCNC: 33.9 PG — SIGNIFICANT CHANGE UP (ref 27–34)
MCV RBC AUTO: 100.3 FL — HIGH (ref 80–100)
MONOCYTES # BLD AUTO: 0.63 K/UL — SIGNIFICANT CHANGE UP (ref 0–0.9)
MONOCYTES NFR BLD AUTO: 8.3 % — SIGNIFICANT CHANGE UP (ref 2–14)
NEUTROPHILS # BLD AUTO: 4.7 K/UL — SIGNIFICANT CHANGE UP (ref 1.8–7.4)
NEUTROPHILS NFR BLD AUTO: 61.9 % — SIGNIFICANT CHANGE UP (ref 43–77)
NITRITE UR-MCNC: NEGATIVE — SIGNIFICANT CHANGE UP
NRBC # BLD: 0 /100 WBCS — SIGNIFICANT CHANGE UP (ref 0–0)
NRBC # FLD: 0 K/UL — SIGNIFICANT CHANGE UP (ref 0–0)
PH UR: 6.5 — SIGNIFICANT CHANGE UP (ref 5–8)
PLATELET # BLD AUTO: 228 K/UL — SIGNIFICANT CHANGE UP (ref 150–400)
POTASSIUM SERPL-MCNC: 4.1 MMOL/L — SIGNIFICANT CHANGE UP (ref 3.5–5.3)
POTASSIUM SERPL-SCNC: 4.1 MMOL/L — SIGNIFICANT CHANGE UP (ref 3.5–5.3)
PROT ?TM UR-MCNC: 8 MG/DL — SIGNIFICANT CHANGE UP
PROT ?TM UR-MCNC: 8 MG/DL — SIGNIFICANT CHANGE UP
PROT SERPL-MCNC: 7.5 G/DL — SIGNIFICANT CHANGE UP (ref 6–8.3)
PROT UR-MCNC: NEGATIVE MG/DL — SIGNIFICANT CHANGE UP
PROT/CREAT UR-RTO: 0.1 RATIO — SIGNIFICANT CHANGE UP (ref 0–0.2)
PROTHROM AB SERPL-ACNC: 9.4 SEC — LOW (ref 9.5–13)
RBC # BLD: 3.42 M/UL — LOW (ref 3.8–5.2)
RBC # FLD: 13.7 % — SIGNIFICANT CHANGE UP (ref 10.3–14.5)
RBC CASTS # UR COMP ASSIST: 0 /HPF — SIGNIFICANT CHANGE UP (ref 0–4)
REVIEW: SIGNIFICANT CHANGE UP
SODIUM SERPL-SCNC: 139 MMOL/L — SIGNIFICANT CHANGE UP (ref 135–145)
SP GR SPEC: 1.02 — SIGNIFICANT CHANGE UP (ref 1–1.03)
SQUAMOUS # UR AUTO: 12 /HPF — HIGH (ref 0–5)
URATE SERPL-MCNC: 5.6 MG/DL — SIGNIFICANT CHANGE UP (ref 2.5–7)
UROBILINOGEN FLD QL: 0.2 MG/DL — SIGNIFICANT CHANGE UP (ref 0.2–1)
WBC # BLD: 7.59 K/UL — SIGNIFICANT CHANGE UP (ref 3.8–10.5)
WBC # FLD AUTO: 7.59 K/UL — SIGNIFICANT CHANGE UP (ref 3.8–10.5)
WBC UR QL: 3 /HPF — SIGNIFICANT CHANGE UP (ref 0–5)

## 2023-08-24 PROCEDURE — 59510 CESAREAN DELIVERY: CPT | Mod: U9,GC

## 2023-08-24 RX ORDER — ONDANSETRON 8 MG/1
4 TABLET, FILM COATED ORAL EVERY 6 HOURS
Refills: 0 | Status: DISCONTINUED | OUTPATIENT
Start: 2023-08-24 | End: 2023-08-25

## 2023-08-24 RX ORDER — SODIUM CHLORIDE 9 MG/ML
1000 INJECTION, SOLUTION INTRAVENOUS
Refills: 0 | Status: DISCONTINUED | OUTPATIENT
Start: 2023-08-24 | End: 2023-08-25

## 2023-08-24 RX ORDER — ACETAMINOPHEN 500 MG
975 TABLET ORAL
Refills: 0 | Status: DISCONTINUED | OUTPATIENT
Start: 2023-08-24 | End: 2023-08-26

## 2023-08-24 RX ORDER — DIPHENHYDRAMINE HCL 50 MG
25 CAPSULE ORAL EVERY 6 HOURS
Refills: 0 | Status: DISCONTINUED | OUTPATIENT
Start: 2023-08-24 | End: 2023-08-26

## 2023-08-24 RX ORDER — ACETAMINOPHEN 500 MG
2 TABLET ORAL
Qty: 0 | Refills: 0 | DISCHARGE

## 2023-08-24 RX ORDER — ACETAMINOPHEN 500 MG
1000 TABLET ORAL ONCE
Refills: 0 | Status: COMPLETED | OUTPATIENT
Start: 2023-08-24 | End: 2023-08-24

## 2023-08-24 RX ORDER — OXYCODONE HYDROCHLORIDE 5 MG/1
5 TABLET ORAL
Refills: 0 | Status: COMPLETED | OUTPATIENT
Start: 2023-08-24 | End: 2023-08-31

## 2023-08-24 RX ORDER — CITRIC ACID/SODIUM CITRATE 300-500 MG
30 SOLUTION, ORAL ORAL ONCE
Refills: 0 | Status: COMPLETED | OUTPATIENT
Start: 2023-08-24 | End: 2023-08-24

## 2023-08-24 RX ORDER — TETANUS TOXOID, REDUCED DIPHTHERIA TOXOID AND ACELLULAR PERTUSSIS VACCINE, ADSORBED 5; 2.5; 8; 8; 2.5 [IU]/.5ML; [IU]/.5ML; UG/.5ML; UG/.5ML; UG/.5ML
0.5 SUSPENSION INTRAMUSCULAR ONCE
Refills: 0 | Status: DISCONTINUED | OUTPATIENT
Start: 2023-08-24 | End: 2023-08-26

## 2023-08-24 RX ORDER — KETOROLAC TROMETHAMINE 30 MG/ML
30 SYRINGE (ML) INJECTION EVERY 6 HOURS
Refills: 0 | Status: COMPLETED | OUTPATIENT
Start: 2023-08-24 | End: 2023-08-25

## 2023-08-24 RX ORDER — NALOXONE HYDROCHLORIDE 4 MG/.1ML
0.1 SPRAY NASAL
Refills: 0 | Status: DISCONTINUED | OUTPATIENT
Start: 2023-08-24 | End: 2023-08-25

## 2023-08-24 RX ORDER — NALBUPHINE HYDROCHLORIDE 10 MG/ML
2.5 INJECTION, SOLUTION INTRAMUSCULAR; INTRAVENOUS; SUBCUTANEOUS EVERY 6 HOURS
Refills: 0 | Status: DISCONTINUED | OUTPATIENT
Start: 2023-08-24 | End: 2023-08-25

## 2023-08-24 RX ORDER — LANOLIN
1 OINTMENT (GRAM) TOPICAL EVERY 6 HOURS
Refills: 0 | Status: DISCONTINUED | OUTPATIENT
Start: 2023-08-24 | End: 2023-08-26

## 2023-08-24 RX ORDER — OXYTOCIN 10 UNIT/ML
333.33 VIAL (ML) INJECTION
Qty: 20 | Refills: 0 | Status: DISCONTINUED | OUTPATIENT
Start: 2023-08-24 | End: 2023-08-25

## 2023-08-24 RX ORDER — HEPARIN SODIUM 5000 [USP'U]/ML
5000 INJECTION INTRAVENOUS; SUBCUTANEOUS EVERY 12 HOURS
Refills: 0 | Status: DISCONTINUED | OUTPATIENT
Start: 2023-08-24 | End: 2023-08-26

## 2023-08-24 RX ORDER — IBUPROFEN 200 MG
600 TABLET ORAL EVERY 6 HOURS
Refills: 0 | Status: COMPLETED | OUTPATIENT
Start: 2023-08-24 | End: 2024-07-22

## 2023-08-24 RX ORDER — FAMOTIDINE 10 MG/ML
20 INJECTION INTRAVENOUS ONCE
Refills: 0 | Status: COMPLETED | OUTPATIENT
Start: 2023-08-24 | End: 2023-08-24

## 2023-08-24 RX ORDER — SODIUM CHLORIDE 9 MG/ML
1000 INJECTION, SOLUTION INTRAVENOUS
Refills: 0 | Status: DISCONTINUED | OUTPATIENT
Start: 2023-08-24 | End: 2023-08-24

## 2023-08-24 RX ORDER — OXYCODONE HYDROCHLORIDE 5 MG/1
5 TABLET ORAL ONCE
Refills: 0 | Status: DISCONTINUED | OUTPATIENT
Start: 2023-08-24 | End: 2023-08-24

## 2023-08-24 RX ORDER — IBUPROFEN 200 MG
1 TABLET ORAL
Qty: 0 | Refills: 0 | DISCHARGE

## 2023-08-24 RX ORDER — BUTORPHANOL TARTRATE 2 MG/ML
0.25 INJECTION, SOLUTION INTRAMUSCULAR; INTRAVENOUS EVERY 6 HOURS
Refills: 0 | Status: DISCONTINUED | OUTPATIENT
Start: 2023-08-24 | End: 2023-08-24

## 2023-08-24 RX ORDER — MAGNESIUM HYDROXIDE 400 MG/1
30 TABLET, CHEWABLE ORAL
Refills: 0 | Status: DISCONTINUED | OUTPATIENT
Start: 2023-08-24 | End: 2023-08-26

## 2023-08-24 RX ORDER — DEXAMETHASONE 0.5 MG/5ML
4 ELIXIR ORAL EVERY 6 HOURS
Refills: 0 | Status: DISCONTINUED | OUTPATIENT
Start: 2023-08-24 | End: 2023-08-25

## 2023-08-24 RX ORDER — SIMETHICONE 80 MG/1
80 TABLET, CHEWABLE ORAL EVERY 4 HOURS
Refills: 0 | Status: DISCONTINUED | OUTPATIENT
Start: 2023-08-24 | End: 2023-08-26

## 2023-08-24 RX ORDER — OXYCODONE HYDROCHLORIDE 5 MG/1
5 TABLET ORAL ONCE
Refills: 0 | Status: DISCONTINUED | OUTPATIENT
Start: 2023-08-24 | End: 2023-08-26

## 2023-08-24 RX ADMIN — Medication 1000 MILLIUNIT(S)/MIN: at 15:51

## 2023-08-24 RX ADMIN — Medication 30 MILLILITER(S): at 09:57

## 2023-08-24 RX ADMIN — ONDANSETRON 4 MILLIGRAM(S): 8 TABLET, FILM COATED ORAL at 17:21

## 2023-08-24 RX ADMIN — Medication 1000 MILLIGRAM(S): at 17:19

## 2023-08-24 RX ADMIN — FAMOTIDINE 20 MILLIGRAM(S): 10 INJECTION INTRAVENOUS at 09:57

## 2023-08-24 RX ADMIN — SODIUM CHLORIDE 75 MILLILITER(S): 9 INJECTION, SOLUTION INTRAVENOUS at 15:46

## 2023-08-24 RX ADMIN — Medication 30 MILLIGRAM(S): at 23:20

## 2023-08-24 RX ADMIN — OXYCODONE HYDROCHLORIDE 5 MILLIGRAM(S): 5 TABLET ORAL at 18:50

## 2023-08-24 RX ADMIN — NALBUPHINE HYDROCHLORIDE 2.5 MILLIGRAM(S): 10 INJECTION, SOLUTION INTRAMUSCULAR; INTRAVENOUS; SUBCUTANEOUS at 16:30

## 2023-08-24 RX ADMIN — HEPARIN SODIUM 5000 UNIT(S): 5000 INJECTION INTRAVENOUS; SUBCUTANEOUS at 21:06

## 2023-08-24 RX ADMIN — Medication 400 MILLIGRAM(S): at 16:56

## 2023-08-24 RX ADMIN — NALBUPHINE HYDROCHLORIDE 2.5 MILLIGRAM(S): 10 INJECTION, SOLUTION INTRAMUSCULAR; INTRAVENOUS; SUBCUTANEOUS at 15:53

## 2023-08-24 RX ADMIN — SODIUM CHLORIDE 200 MILLILITER(S): 9 INJECTION, SOLUTION INTRAVENOUS at 09:57

## 2023-08-24 RX ADMIN — OXYCODONE HYDROCHLORIDE 5 MILLIGRAM(S): 5 TABLET ORAL at 17:28

## 2023-08-24 NOTE — OB PROVIDER H&P - NSHPSOCIALHISTORY_GEN_ALL_CORE
Social: Denies cigarette/tobacco/alcohol/illicit drug use    Psych: Denies anxiety/depression, postpartum depression, other mental health disease

## 2023-08-24 NOTE — OB RN INTRAOPERATIVE NOTE - NSSELHIDDEN_OBGYN_ALL_OB_FT
[NS_DeliveryAttending1_OBGYN_ALL_OB_FT:[id number]],[NS_DeliveryAssist1_OBGYN_ALL_OB_FT:[id number]],[NS_DeliveryRN_OBGYN_ALL_OB_FT:[id number]]

## 2023-08-24 NOTE — OB POSTPARTUM EVENT NOTE - NS_EVENTPTSUMMARY1_OBGYN_ALL_OB_FT
s/p c/s, , IV Fluids 2000  pitocin at 125 cc/hr, LR 75 cc/hr  fundus firm, at umbilicus, light bleeding noted

## 2023-08-24 NOTE — DISCHARGE NOTE OB - MEDICATION SUMMARY - MEDICATIONS TO TAKE
I will START or STAY ON the medications listed below when I get home from the hospital:    Motrin 600 mg oral tablet  -- 1 by mouth 4 times a day as needed for  mild pain  -- Indication: For  delivery    Tylenol 500 mg oral tablet  -- 2 by mouth 4 times a day as needed for pain  -- Indication: For  delivery

## 2023-08-24 NOTE — OB RN DELIVERY SUMMARY - NSSELHIDDEN_OBGYN_ALL_OB_FT
[NS_DeliveryAttending1_OBGYN_ALL_OB_FT:[id number]],[NS_DeliveryAssist1_OBGYN_ALL_OB_FT:[id number]],[NS_DeliveryRN_OBGYN_ALL_OB_FT:[id number]]

## 2023-08-24 NOTE — DISCHARGE NOTE OB - CARE PLAN
Principal Discharge DX:	 delivery delivered  Assessment and plan of treatment:	stable, routine postoperative care   1

## 2023-08-24 NOTE — DISCHARGE NOTE OB - PATIENT PORTAL LINK FT
You can access the FollowMyHealth Patient Portal offered by [facility] by registering at the following website: [email or web address]. By joining [facility]’s FollowMyHealth portal, you will also be able to view your health information using other applications (apps) compatible with our system.

## 2023-08-24 NOTE — OB PROVIDER H&P - HISTORY OF PRESENT ILLNESS
38 year old  @ 37.5 weeks, ENEIDA 2023 dated by LMP (12/3/2022) consistent with 1st Trimester US, presents to L&D for scheduled repeat  secondary to history of classical . NPO TIME 6 pm last night. Patient admits to normal fetal movement. Denies vaginal bleeding, leakage of fluid, painful contractions/lower abdominal cramping, fever/chills, shortness of breath,  chest pain, increased swelling, difficulty ambulating, loss of taste/smell, nausea/vomiting/diarrhea, rash, weakness, paresthesia, change in appetite, dizziness, lightheadedness, cough, nasal congestion, runny nose    Antepartum Course:  1. History of classical   2. AMA  3. Benign pituitary tumor, last MRI 2023    Med Hx:   Benign Pituitary Prolactinoma, followed by Dr. Julian at [place], with last MRI 2023, with size "stable unknown dimensions" for last 2 pregnancies was treated with carbergoline but not in this pregnancy    Denies asthma, HTN, DM, CAD, or other medical issues

## 2023-08-24 NOTE — DISCHARGE NOTE OB - CARE PROVIDER_API CALL
Stevie Garcia  Obstetrics and Gynecology  [address], [address]  [place] [address]  Phone: [phone]  Fax: [phone]  Follow Up Time:

## 2023-08-24 NOTE — OB PROVIDER H&P - NSLOWPPHRISK_OBGYN_A_OB
[place] Pregnancy/Less than or equal to 4 previous vaginal births/No known bleeding disorder/No history of postpartum hemorrhage/No other PPH risks indicated

## 2023-08-24 NOTE — OB PROVIDER H&P - NS_OBGYNHISTORY_OBGYN_ALL_OB_FT
OB History: : 2019, primary emergency classical  delivered @ 25 weeks, complicated by IUGR and infant death at 5 months secondary to prematurity, delivered at [facility]  G2: 2020, scheduled repeat , FT, vacuum assisted, Female, 2200 g  G3: Current pregnancy, AMA      - Denies HTN/DM/fetal issues    Prenatal Labs Reviewed:  T&S: A+  Rubella: Immune  Hep B: Neg  HIV: Neg  RPR: Neg  GCT: 80  G/C: Neg  GBS: Neg     Ultrasounds:   : Raymundo breech, posterior placenta, BPP , EFW 2367 g (61%ile)    GYN Hx: Admits to having fibroids, unknown size, Denies ovarian cysts, HSV/ STDs, abnormal pap smears

## 2023-08-24 NOTE — OB PROVIDER DELIVERY SUMMARY - NSSELHIDDEN_OBGYN_ALL_OB_FT
[NS_DeliveryAttending1_OBGYN_ALL_OB_FT:[id number]],[NS_DeliveryAssist1_OBGYN_ALL_OB_FT:[id number]],[NS_DeliveryRN_OBGYN_ALL_OB_FT:[id number]]

## 2023-08-24 NOTE — OB PROVIDER H&P - NS_PRENATALLABSOURCEGBS36_OBGYN_ALL_OB
Teaching-Supervisory Addendum-Brief   I participated in the following activities of this patients care: the medical history, the physical exam, medical decision making, the procedure.     I personally performed: supervision of the patient's care, the medical history, the physical exam, the medical decision making.     The case was discussed with: the resident    Procedures: I directly supervised any procedure(s) noted by resident    Evaluation and management service: I agree with the evaluation and management decisions made in this patient's care.           Worsening PAD, sent in by cards  Needs angio  Warm foot, difficult to palpate pulse, but full rom, no pain, complaints of claudication  VSS  Hemodynamically stable  No DVT       Tereso Garrett MD  01/06/21 0205     hard copy, drawn during this pregnancy

## 2023-08-24 NOTE — OB PROVIDER DELIVERY SUMMARY - NSPROVIDERDELIVERYNOTE_OBGYN_ALL_OB_FT
Procedure: rLTCS  Preop Dx: previous C/S  QBL: 552cc  IVF:  2L crystalloids  UOP: 240ml  Layers of uterine closure: one  Complications: none  Specimen: none   Findings: fibroid uterus (fundal intramural, anterior intramural, +smaller fibroids throughout), moderate scar tissue peritoneum & rectus layers, no significant adhesions within intraabdominal cavityj, grossly normal BL fallopian tubes, BL ovaries  Hemostatic/intraoperative agents: interceed & fibrillar   Baby: F infant, vtx    Aaron Choudhary, PGY4 Procedure: rLTCS  Preop Dx: previous C/S  QBL: 552cc  IVF:  2L crystalloids  UOP: 240ml  Layers of uterine closure: one  Complications: none  Specimen: none   Findings: fibroid uterus (fundal intramural, anterior intramural, +smaller fibroids throughout), moderate scar tissue peritoneum & rectus layers, no significant adhesions within intraabdominal cavity, grossly normal BL fallopian tubes, BL ovaries  Hemostatic/intraoperative agents: interceed & fibrillar   Baby: F infant, vtx    Aaron Choudhary, PGY4  #[phone]

## 2023-08-24 NOTE — DISCHARGE NOTE OB - HOSPITAL COURSE
pt presented for scheduled repeat  section at 37.5 weeks gestation  she underwent repeat LST CS on 23 with delivery of female weight 6 lbs 1 oz   uncomplicated postoperative course

## 2023-08-24 NOTE — OB PROVIDER H&P - NSHPPHYSICALEXAM_GEN_ALL_CORE
Physical Exam:  Vitals: ICU Vital Signs Last 24 Hrs  T(C): 36.5 (24 Aug 2023 08:27), Max: 36.5 (24 Aug 2023 08:27)  T(F): 97.7 (24 Aug 2023 08:27), Max: 97.7 (24 Aug 2023 08:27)  HR: 61 (24 Aug 2023 08:27) (61 - 71)  BP: 139/87 (24 Aug 2023 08:27) (135/94 - 139/87)  BP(mean): --  ABP: --  ABP(mean): --  RR: 14 (24 Aug 2023 08:27) (14 - 14)  SpO2: --    O2 Parameters below as of 24 Aug 2023 08:27  Patient On (Oxygen Delivery Method): room air    Gen: NAD, A+O x 3, resting comfortably  Resp: CTAB  Cardio: RRR  Abd: Gravid, soft, non-distended, non-tender to superficial and deep palpation in all 4 quadrants, no rebound/guarding  Ext: Warm, well perfused, 1+ nonpitting edema bilaterally    EFM: 125 bpm, moderate variability with spontaneous accelerations, no decelerations, Reactive NST  [place]: Acontractile

## 2023-08-24 NOTE — OB PROVIDER H&P - ASSESSMENT
38 year old  @ 37.5 weeks, admitted to L&D for scheduled repeat  for history of classical , Reactive NST, vital signs stable  - Admit to L&D  - NPO  - IV access, CBC/T&C/RPR  - Pepcid and Bicitra as per pre-op policy  - T&C 2 units PRBCs  - Anesthesia consult   - Consent to be discussed and obtained by Dr. Strauss  - Plan to move to OR on time schedule  - Educated and discussed with patient the assessment and plan, pt verbalized understanding and agreement with assessment and plan, all questions answered  - Discussed with Dr. Strauss

## 2023-08-24 NOTE — DISCHARGE NOTE OB - NS MD DC FALL RISK RISK
For information on Fall & Injury Prevention, visit: [email or web address] OR  [email or web address] OR  https://www.cdc.gov/steadi/patient.html

## 2023-08-25 LAB
BASOPHILS # BLD AUTO: 0.01 K/UL — SIGNIFICANT CHANGE UP (ref 0–0.2)
BASOPHILS NFR BLD AUTO: 0.1 % — SIGNIFICANT CHANGE UP (ref 0–2)
EOSINOPHIL # BLD AUTO: 0 K/UL — SIGNIFICANT CHANGE UP (ref 0–0.5)
EOSINOPHIL NFR BLD AUTO: 0 % — SIGNIFICANT CHANGE UP (ref 0–6)
HCT VFR BLD CALC: 30.1 % — LOW (ref 34.5–45)
HGB BLD-MCNC: 10 G/DL — LOW (ref 11.5–15.5)
IANC: 7.87 K/UL — HIGH (ref 1.8–7.4)
IMM GRANULOCYTES NFR BLD AUTO: 0.4 % — SIGNIFICANT CHANGE UP (ref 0–0.9)
LYMPHOCYTES # BLD AUTO: 1.38 K/UL — SIGNIFICANT CHANGE UP (ref 1–3.3)
LYMPHOCYTES # BLD AUTO: 13.5 % — SIGNIFICANT CHANGE UP (ref 13–44)
MCHC RBC-ENTMCNC: 33.2 GM/DL — SIGNIFICANT CHANGE UP (ref 32–36)
MCHC RBC-ENTMCNC: 33.2 PG — SIGNIFICANT CHANGE UP (ref 27–34)
MCV RBC AUTO: 100 FL — SIGNIFICANT CHANGE UP (ref 80–100)
MONOCYTES # BLD AUTO: 0.94 K/UL — HIGH (ref 0–0.9)
MONOCYTES NFR BLD AUTO: 9.2 % — SIGNIFICANT CHANGE UP (ref 2–14)
NEUTROPHILS # BLD AUTO: 7.87 K/UL — HIGH (ref 1.8–7.4)
NEUTROPHILS NFR BLD AUTO: 76.8 % — SIGNIFICANT CHANGE UP (ref 43–77)
NRBC # BLD: 0 /100 WBCS — SIGNIFICANT CHANGE UP (ref 0–0)
NRBC # FLD: 0 K/UL — SIGNIFICANT CHANGE UP (ref 0–0)
PLATELET # BLD AUTO: 209 K/UL — SIGNIFICANT CHANGE UP (ref 150–400)
RBC # BLD: 3.01 M/UL — LOW (ref 3.8–5.2)
RBC # FLD: 13.6 % — SIGNIFICANT CHANGE UP (ref 10.3–14.5)
T PALLIDUM AB TITR SER: NEGATIVE — SIGNIFICANT CHANGE UP
WBC # BLD: 10.24 K/UL — SIGNIFICANT CHANGE UP (ref 3.8–10.5)
WBC # FLD AUTO: 10.24 K/UL — SIGNIFICANT CHANGE UP (ref 3.8–10.5)

## 2023-08-25 RX ORDER — OXYCODONE HYDROCHLORIDE 5 MG/1
5 TABLET ORAL
Refills: 0 | Status: DISCONTINUED | OUTPATIENT
Start: 2023-08-25 | End: 2023-08-26

## 2023-08-25 RX ORDER — IBUPROFEN 200 MG
600 TABLET ORAL EVERY 6 HOURS
Refills: 0 | Status: DISCONTINUED | OUTPATIENT
Start: 2023-08-25 | End: 2023-08-26

## 2023-08-25 RX ADMIN — OXYCODONE HYDROCHLORIDE 5 MILLIGRAM(S): 5 TABLET ORAL at 10:45

## 2023-08-25 RX ADMIN — Medication 975 MILLIGRAM(S): at 15:10

## 2023-08-25 RX ADMIN — Medication 975 MILLIGRAM(S): at 21:31

## 2023-08-25 RX ADMIN — OXYCODONE HYDROCHLORIDE 5 MILLIGRAM(S): 5 TABLET ORAL at 09:56

## 2023-08-25 RX ADMIN — HEPARIN SODIUM 5000 UNIT(S): 5000 INJECTION INTRAVENOUS; SUBCUTANEOUS at 09:55

## 2023-08-25 RX ADMIN — Medication 600 MILLIGRAM(S): at 18:00

## 2023-08-25 RX ADMIN — Medication 30 MILLIGRAM(S): at 11:50

## 2023-08-25 RX ADMIN — Medication 975 MILLIGRAM(S): at 16:00

## 2023-08-25 RX ADMIN — Medication 30 MILLIGRAM(S): at 12:45

## 2023-08-25 RX ADMIN — Medication 30 MILLIGRAM(S): at 00:15

## 2023-08-25 RX ADMIN — Medication 30 MILLIGRAM(S): at 06:00

## 2023-08-25 RX ADMIN — Medication 30 MILLIGRAM(S): at 05:27

## 2023-08-25 RX ADMIN — HEPARIN SODIUM 5000 UNIT(S): 5000 INJECTION INTRAVENOUS; SUBCUTANEOUS at 21:48

## 2023-08-25 RX ADMIN — Medication 975 MILLIGRAM(S): at 22:31

## 2023-08-25 RX ADMIN — Medication 600 MILLIGRAM(S): at 18:43

## 2023-08-25 NOTE — PROGRESS NOTE ADULT - ASSESSMENT
A/P: 39yo POD#1 s/p LTCS.  Patient is stable and doing well post-operatively.      #Postop from LTCS  - Continue regular diet.  - Increase ambulation.  - Continue motrin, tylenol, oxycodone PRN for pain control.  - F/u AM CBC    Rosa Pop MD PGY1

## 2023-08-26 VITALS
SYSTOLIC BLOOD PRESSURE: 118 MMHG | HEART RATE: 87 BPM | OXYGEN SATURATION: 98 % | RESPIRATION RATE: 18 BRPM | TEMPERATURE: 98 F | DIASTOLIC BLOOD PRESSURE: 75 MMHG

## 2023-08-26 RX ADMIN — OXYCODONE HYDROCHLORIDE 5 MILLIGRAM(S): 5 TABLET ORAL at 15:30

## 2023-08-26 RX ADMIN — OXYCODONE HYDROCHLORIDE 5 MILLIGRAM(S): 5 TABLET ORAL at 08:44

## 2023-08-26 RX ADMIN — Medication 600 MILLIGRAM(S): at 13:42

## 2023-08-26 RX ADMIN — Medication 975 MILLIGRAM(S): at 03:57

## 2023-08-26 RX ADMIN — Medication 600 MILLIGRAM(S): at 05:46

## 2023-08-26 RX ADMIN — Medication 600 MILLIGRAM(S): at 01:31

## 2023-08-26 RX ADMIN — Medication 600 MILLIGRAM(S): at 14:30

## 2023-08-26 RX ADMIN — OXYCODONE HYDROCHLORIDE 5 MILLIGRAM(S): 5 TABLET ORAL at 09:30

## 2023-08-26 RX ADMIN — OXYCODONE HYDROCHLORIDE 5 MILLIGRAM(S): 5 TABLET ORAL at 16:44

## 2023-08-26 RX ADMIN — Medication 600 MILLIGRAM(S): at 00:31

## 2023-08-26 RX ADMIN — Medication 975 MILLIGRAM(S): at 04:57

## 2023-08-26 NOTE — PROGRESS NOTE ADULT - SUBJECTIVE AND OBJECTIVE BOX
POST OP DAY  1  s/p   SECTION    SUBJECTIVE:  I'm ok.    PAIN SCALE SCORE: [x] Refer to charted pain scores    THERAPY:  [x  ] Spinal morphine   [  ] Epidural morphine   [  ] IV PCA Hydromorphone 1 mg/ml    OBJECTIVE:  Comfortable Appearing    SEDATION SCORE:	  [ x ] Alert	    [  ] Drowsy        [  ] Arousable	[  ] Asleep	[  ] Unresponsive    Side Effects:	  [ x ] None	     [  ] Nausea        [  ] Pruritus        [  ] Weakness   [  ] Numbness        ASSESSMENT/ PLAN   [ x  ] Discontinue         [  ] Continue    [ x ] Change to prn Analgesics as per primary service.    DOCUMENTATION & VERIFICATION OF CURRENT MEDS [ x ] Done    COMMENTS: No Headache.  
Patient seen and examined at bedside, resting comfortably in no acute distress. Denies fever, chills, nausea or vomiting. She is tolerating a regular diet. She is ambulating without difficulty and voiding spontaneously. Pain is well controlled. Bleeding is less than a normal menses. Reports passing gas but has not yet had a bowel movement.    Physical Examination:  Vital Signs: Vital Signs Last 24 Hrs  T(C): 36.7 (26 Aug 2023 06:00), Max: 36.9 (25 Aug 2023 10:57)  T(F): 98 (26 Aug 2023 06:00), Max: 98.4 (25 Aug 2023 10:57)  HR: 79 (26 Aug 2023 06:00) (74 - 89)  BP: 105/67 (26 Aug 2023 06:00) (105/67 - 120/79)  BP(mean): --  RR: 18 (26 Aug 2023 06:00) (16 - 18)  SpO2: 100% (26 Aug 2023 06:00) (100% - 100%)    Parameters below as of 25 Aug 2023 21:41  Patient On (Oxygen Delivery Method): room air      General: alert and oriented, no acute distress  Cardio: regular rate and rhythm  Respiratory: non labored respirations  Abdomen: soft, non-tender, non-distended; bowel sounds present; uterus firm, palpated below the umbilicus; incision clean, dry and intact, surrounding skin non erythematous  VE: minimal lochia noted  Musculoskeletal: No erythema/edema, no calf tenderness bilaterally    MEDICATIONS  (STANDING):  acetaminophen     Tablet .. 975 milliGRAM(s) Oral <User Schedule>  diphtheria/tetanus/pertussis (acellular) Vaccine (Adacel) 0.5 milliLiter(s) IntraMuscular once  heparin   Injectable 5000 Unit(s) SubCutaneous every 12 hours  ibuprofen  Tablet. 600 milliGRAM(s) Oral every 6 hours      Labs:  Blood type: A Positive  Rubella IgG: RPR: Negative                          10.0<L>   10.24 >-----------< 209    ( 08-25 @ 07:56 )             30.1<L>                        11.6   7.59 >-----------< 228    ( 08-24 @ 08:42 )             34.3<L>    08-24-23 @ 08:42      139  |  104  |  15  ----------------------------<  77  4.1   |  23  |  0.73        Ca    9.2      24 Aug 2023 08:42    TPro  7.5  /  Alb  4.0  /  TBili  0.2  /  DBili  x   /  AST  13  /  ALT  6   /  AlkPhos  227<H>  08-24-23 @ 08:42    Assessment and Plan:   37yo s/p rCS on now POD#1 .  Patient is stable and doing well post-partum.   Blood type: A+    Plan:  - VS per unit protocol  - SCDs for DVT ppx  - Regular diet  - Pain well controlled, continue current pain regimen  - Encourage ambulation  - Continue wound care  - Discharge instructions reviewed  - D/c planning initiated, patient to follow up in office in 6 weeks for post partum visit    Vinny Cochran MD
OB Progress Note:  Delivery, POD#1    S: 37yo POD#1 s/p LTCS . Her pain is well controlled. She is tolerating a regular diet and passing flatus. Denies N/V. Denies CP/SOB/lightheadedness/dizziness.   She is ambulating without difficulty.   Voiding spontaneously.     O:   Vital Signs Last 24 Hrs  T(C): 36.7 (25 Aug 2023 06:00), Max: 36.8 (24 Aug 2023 21:40)  T(F): 98 (25 Aug 2023 06:00), Max: 98.2 (24 Aug 2023 21:40)  HR: 68 (25 Aug 2023 06:00) (47 - 72)  BP: 120/75 (25 Aug 2023 06:00) (92/67 - 149/135)  BP(mean): 76 (24 Aug 2023 20:32) (65 - 139)  RR: 19 (25 Aug 2023 06:00) (8 - 19)  SpO2: 100% (25 Aug 2023 06:00) (95% - 100%)    Parameters below as of 24 Aug 2023 21:40  Patient On (Oxygen Delivery Method): room air        Labs:  Blood type: A Positive  Rubella IgG: RPR: Negative                          11.6   7.59 >-----------< 228    (  @ 08:42 )             34.3<L>    23 @ 08:42      139  |  104  |  15  ----------------------------<  77  4.1   |  23  |  0.73        Ca    9.2      24 Aug 2023 08:42    TPro  7.5  /  Alb  4.0  /  TBili  0.2  /  DBili  x   /  AST  13  /  ALT  6   /  AlkPhos  227<H>  23 @ 08:42          PE:  General: NAD  Abdomen: Mildly distended, appropriately tender, incision c/d/i, sealed with steri strips.  Extremities: No erythema, no pitting edema

## 2023-09-04 NOTE — OB RN PATIENT PROFILE - AGENT'S NAME
Was a pleasure taking care of you during your hospital stay.    You were treated for an infection of your colon called Clostridioides difficile colitis.  This is a very difficult infection to treat.  You will need to continue taking oral vancomycin solution for the next few weeks.  Please take as directed.  It is very easy for this infection to recur unfortunately.  You may follow-up with your primary care doctor to decide if you will need a regimen of oral vancomycin indefinitely for chronic suppression of this particular bacteria.    We want to make sure that you are safe after your hospital stay.  Please alert staff or call 911 if you begin to have recurrence of your uncontrolled diarrhea, including more than 4 bowel movements in a day or liquid poop.    Please discontinue taking your blood pressure Medication losartan and please discontinue taking your regular gastric reflux control medication famotidine.   José Nuñez [phone]

## 2023-09-05 RX ORDER — IBUPROFEN 800 MG/1
800 TABLET ORAL EVERY 8 HOURS
Qty: 1 | Refills: 1 | Status: ACTIVE | COMMUNITY
Start: 2023-09-05 | End: 1900-01-01

## 2023-09-13 PROBLEM — Z87.19 PERSONAL HISTORY OF OTHER DISEASES OF THE DIGESTIVE SYSTEM: Chronic | Status: ACTIVE | Noted: 2023-08-09

## 2023-09-13 PROBLEM — Z87.898 PERSONAL HISTORY OF OTHER SPECIFIED CONDITIONS: Chronic | Status: ACTIVE | Noted: 2023-08-09

## 2023-09-13 PROBLEM — Z34.90 ENCOUNTER FOR SUPERVISION OF NORMAL PREGNANCY, UNSPECIFIED, UNSPECIFIED TRIMESTER: Chronic | Status: ACTIVE | Noted: 2023-08-09

## 2023-09-13 PROBLEM — D35.2 BENIGN NEOPLASM OF PITUITARY GLAND: Chronic | Status: ACTIVE | Noted: 2023-08-09

## 2023-10-17 ENCOUNTER — APPOINTMENT (OUTPATIENT)
Dept: OBGYN | Facility: CLINIC | Age: 38
End: 2023-10-17
Payer: COMMERCIAL

## 2023-10-17 VITALS
BODY MASS INDEX: 35.08 KG/M2 | WEIGHT: 198 LBS | HEIGHT: 63 IN | SYSTOLIC BLOOD PRESSURE: 132 MMHG | DIASTOLIC BLOOD PRESSURE: 88 MMHG | HEART RATE: 81 BPM

## 2023-10-17 PROCEDURE — 0503F POSTPARTUM CARE VISIT: CPT

## 2024-01-01 NOTE — OB RN PATIENT PROFILE - TEACHING/LEARNING FACTORS IMPACT ABILITY TO LEARN
Occupational Therapy    Visit Type: treatment  Born at Gestational Age: 34w0d and now corrected age 35w 2d    Present at bedside: Nurse  Precautions: per guidelines and per protocol    SUBJECTIVE  RN in agreement to work with patient for therapy session.  Patient / Family Goals: maximize function and meet developmental milestones    Pain   RN completed pain assessment during care time    OBJECTIVE    Autonomic Stability:    Heart Rate: stable    Blood Pressure: stable    Respiratory: stable    Oxygen Saturations: stable    Color: stable color    Temperature: stable    Visceral/GI: stable    Bed Type: radiant warmer  Photo Therapy: no  Respiratory Support: room air  Respiratory Comments: tolerating well  Current Positioning Device: Nest, medium, Frog and Swaddle    State: wide awake, vigorous movement    Neurobehavioral:    Cry: strength/pitch appropriate to experience    State regulation: spontaneous arousal    Tolerance to sensory input: exaggerated response    Visual orientation: follows briefly to side    Infant stress cues:      -motor: poor quality of movement and squirming or kicking      -behavioral: hypersensitive and hyper-alert    Irritability: cries to 1-2 stimuli    Consolability: consoled by swaddling and consoled by sucking    Self regulation:       -achieved with assistance of: swaddle and non-nutritive suck on pacifier    Postural Assessment:   UE position:    - Left: elbow flexed and arm at side    - Right: elbow flexed and arm at side       Reflexes  Rooting: full head turning with or without mouth opening  Sucking: emerging  Palmar Grasp:  Left: present   Right: present  Plantar Grasp:  Left: present  Right: present      Movement:  Overall analysis of movement during session: uncoordinated and jerky       Interventions    Treatment Provided:  -activity tolerance and positioning  Neurodevelopmental treatment         ASSESSMENT    - Impairments: head control, postural control, tolerance to sensory  stimulation, state regulation, tolerance to handling, movement quality, physiologic stability and activity tolerance  - Functional Limitations: self regulation, transitional movement and asymmetry of movement  Patient was born at 34 0/7 weeks Gestational Age via C-sec 2* mono-di twin gestation. Fetal growth restriction of infants.  Infant brings hands to midline and mouth.  Containment helpful during handling and caregiving, change in position.  Turns head side to side with facilitation.  Discharge Recommendations:     OT Referrals/Discharge Recommendations: Refer to development clinic, Refer to early intervention             - Progress: progressing toward goals    End of Session:  Location: radiant warmer  Safety measures: equipment intact and lines intact  Handoff to: nurse    PLAN  Suggestions for next session as indicated: Frequency Comments: 2/1, 2 Frequency of Treatment: 1-2x weekly  Interventions: handling, motor training, facilitation of symmetrical movement patterns, postural training, sensory stimulation, therapeutic activity, facilitation of transitions, sensory regulation, caregiver training, state regulation, activity tolerance training and progress motor skills          GOALS    Long Term Goals: (to be met by time of discharge from hospital)    Infant will consistently bring hands to face/mouth with minimal cueing to support state an self-regulation  Infant will maintain semi-flexed extremity posture in supine and prone  Infant will life head in prone and hold momentarily in supported sitting  Infant will tolerate handling, movement, caregiving procedures with minimal to no stress signs or need for outside containment.  Infant will reach and sustain quiet alert state for 1 minute with sensory and environmental support to promote engagement with caregiver  Will provide caregiver education on identifying infant behavioral cues and strategies to promote state/self-regulation and sensorimotor development  to promote bonding/attachment, and self-regulation.    All goals ongoing 2/1  Documented in the chart in the following areas: Assessment/Plan.      Therapy procedure time and total treatment time can be found documented on the Time Entry flowsheet   none

## 2024-03-04 ENCOUNTER — APPOINTMENT (OUTPATIENT)
Dept: OBGYN | Facility: CLINIC | Age: 39
End: 2024-03-04
Payer: COMMERCIAL

## 2024-03-04 VITALS
SYSTOLIC BLOOD PRESSURE: 127 MMHG | DIASTOLIC BLOOD PRESSURE: 85 MMHG | HEIGHT: 63 IN | HEART RATE: 64 BPM | WEIGHT: 190 LBS | BODY MASS INDEX: 33.66 KG/M2

## 2024-03-04 DIAGNOSIS — D35.2 BENIGN NEOPLASM OF PITUITARY GLAND: ICD-10-CM

## 2024-03-04 DIAGNOSIS — Z01.411 ENCOUNTER FOR GYNECOLOGICAL EXAMINATION (GENERAL) (ROUTINE) WITH ABNORMAL FINDINGS: ICD-10-CM

## 2024-03-04 DIAGNOSIS — Z30.011 ENCOUNTER FOR INITIAL PRESCRIPTION OF CONTRACEPTIVE PILLS: ICD-10-CM

## 2024-03-04 DIAGNOSIS — E66.9 OBESITY, UNSPECIFIED: ICD-10-CM

## 2024-03-04 PROCEDURE — 99213 OFFICE O/P EST LOW 20 MIN: CPT | Mod: 25

## 2024-03-04 PROCEDURE — 99395 PREV VISIT EST AGE 18-39: CPT

## 2024-03-04 RX ORDER — NORGESTIMATE AND ETHINYL ESTRADIOL 7DAYSX3 LO
0.18/0.215/0.25 KIT ORAL
Qty: 84 | Refills: 3 | Status: ACTIVE | COMMUNITY
Start: 2024-03-04 | End: 1900-01-01

## 2024-03-04 NOTE — HISTORY OF PRESENT ILLNESS
[FreeTextEntry1] : 38 y.o. P 2112. LNMP , none recently  pt is breastfeeding, presents for annual  exam, , pt feels well,  PMH- pituitary adenoma, previously on Cabergoline, but on no meds currently,  followed by Neurosurgeon and Endocrinologist,. , pt is on weight loss injectable , once weekly,  x 1 month.  PSHx - negative, FH - Breast ca - Pat aunt. Leukemia- MGM,  SH -negative, GYN hx -  ASCUS pap. s/p colpo. OB hx - previous c/s x 2,  ROS - Clinical Research  , for  [employer]  pt is getting  at [facility] .  pt wants contraception, ( the pill)

## 2024-03-04 NOTE — DISCUSSION/SUMMARY
[FreeTextEntry1] : A - WWV      contraceptive management     hx of ASCUS /HRHPV     pituitary adenoma    obesity  P- f/u 1 year      pap done      exercise encouraged,      nutritional counseling provided     Maude one year's supply sent to pharmacy.

## 2024-03-04 NOTE — PHYSICAL EXAM
[Appropriately responsive] : appropriately responsive [Alert] : alert [No Acute Distress] : no acute distress [No Lymphadenopathy] : no lymphadenopathy [Regular Rate Rhythm] : regular rate rhythm [No Murmurs] : no murmurs [Clear to Auscultation B/L] : clear to auscultation bilaterally [Soft] : soft [Non-distended] : non-distended [Non-tender] : non-tender [No HSM] : No HSM [No Lesions] : no lesions [No Mass] : no mass [Oriented x3] : oriented x3 [Examination Of The Breasts] : a normal appearance [No Masses] : no breast masses were palpable [Labia Majora] : normal [Labia Minora] : normal [Normal] : normal [Anteversion] : anteverted [Uterine Adnexae] : normal

## 2024-03-05 LAB — HPV HIGH+LOW RISK DNA PNL CVX: NOT DETECTED

## 2024-03-08 LAB — CYTOLOGY CVX/VAG DOC THIN PREP: ABNORMAL

## 2024-04-16 NOTE — OB RN PATIENT PROFILE - NS_SUPPORTPERSONNAME_OBGYN_ALL_OB_FT
Writer spoke to patient in regards to the neuropsychological service wait list. Patient wished to still receive service. Writer let patient know that when the provider returns in June writer will verify the insurance and obtain an authorization. And call patient back if it was approved.   
José Nuñez

## 2025-04-25 ENCOUNTER — APPOINTMENT (OUTPATIENT)
Dept: OBGYN | Facility: CLINIC | Age: 40
End: 2025-04-25

## 2025-05-15 NOTE — OB RN DELIVERY SUMMARY - NS_PROPHYLABX_OBGYN_ALL_OB
as pictured below. The Octaray was placed in the LV and used to pace the LV during the EPS.     Post Ablation LA Voltage Map       Next, baseline recordings and a diagnostic EP study was performed.     The coronary sinus multipolar catheter was used to pace the left atrium during the EP study. The LA CS electrograms were documented and interpreted during the procedure. The patient did not have sustained slow pathway conduction or evidence of an accessory pathway. Ventricular pacing revealed concentric and decremental conduction.     At the completion of the ablation and EPS, 100 units of Protamine was delivered to reverse the effect of heparin.      A Perclose was used to close the Faradrive sheath and 2 VASCADE MVP devices were used to close the venotomy sites. The MVP tools were advanced into the 8 Fr and 10 Fr sheaths, respectively; the sheaths were then removed. The collagen was then deployed and a 30 second dwell time was performed to allow for expansion of the collagen. The delivery tools were then removed with adequate hemostasis.     The patient tolerated the procedure well with no acute complications recognized. The patient left the EP lab in stable condition, in normal sinus rhythm. Just prior to pulling shealths, the ICE catheter was used to obtain ultrasound images and revealed no evidence of pericardial effusion.     Baseline Intervals     QRS duration: 67 msec  OR interval: 160 msec  RR interval: 880 msec  AH interval: 137 msec  HV interval: 41 msec     EP Study     AV [place]: 340 msec  VA Wenchebach: > 500 msec      PFA Applications: 10     Complications: None     Summary:   1. Successful pulmonary vein isolation.  2.  Successful LA posterior wall isolation.   3. Creation of a line of bidirectional block at the cavotricuspid isthmus.  4.         Comprehensive EP study.     Corey Sheldon MD, MS  Clinical Cardiac Electrophysiology  5/15/2025  10:32 AM   
Yes